# Patient Record
Sex: FEMALE | Race: BLACK OR AFRICAN AMERICAN | Employment: FULL TIME | URBAN - METROPOLITAN AREA
[De-identification: names, ages, dates, MRNs, and addresses within clinical notes are randomized per-mention and may not be internally consistent; named-entity substitution may affect disease eponyms.]

---

## 2019-06-24 ENCOUNTER — HOSPITAL ENCOUNTER (OUTPATIENT)
Dept: MAMMOGRAPHY | Age: 49
Discharge: HOME | End: 2019-06-29
Payer: COMMERCIAL

## 2019-06-24 DIAGNOSIS — Z12.31 VISIT FOR SCREENING MAMMOGRAM: ICD-10-CM

## 2019-06-24 PROCEDURE — 77067 SCR MAMMO BI INCL CAD: CPT

## 2020-06-24 ENCOUNTER — OFFICE VISIT (OUTPATIENT)
Dept: PRIMARY CARE CLINIC | Age: 50
End: 2020-06-24
Payer: COMMERCIAL

## 2020-06-24 PROCEDURE — 99211 OFF/OP EST MAY X REQ PHY/QHP: CPT | Performed by: INTERNAL MEDICINE

## 2020-06-24 NOTE — PROGRESS NOTES
6/24/2020    FLU/COVID-19 CLINIC EVALUATION    HPI  SYMPTOMS:    Symptom duration, days:  [] 1   [] 2   [] 3   [] 4   [] 5   [] 6   [] 7   [] 8   [] 9   [] 10   [] 11   [] 12   [] 13 [] 14 +      Symptom course:   [] Worsening     [] Stable     [] Improving    [] Fevers  [] Symptom (not measured)  [] Measured (Result: )  [] Chills  [] Cough  [] Coughing up blood  [] Productive  [] Dry  [] Chest Congestion  [] Chest Tightness  [] Nasal Congestion  [] Runny Nose  [] Feeling short of breath  [] Fatigue  [] Chest pain  [] Headaches  []Tolerable  [] Severe  [] Sore throat  [] Muscle aches  [] Nausea  [] Decreased appetite  [] Vomiting  []Unable to keep fluids down  [] Diarrhea  []Severe    [] OTHER SYMPTOMS:      RISK FACTORS:  [] Pregnant or possibly pregnant  [] Age over 61  [] Diabetes  [] Heart disease  [] Asthma  [] COPD/Other chronic lung diseases  [] Active Cancer  [] On Chemotherapy  [] Taking oral steroids  [] History Lymphoma/Leukemia  [] Close contact with a lab confirmed COVID-19 patient within 14 days of symptom onset  [] History of travel from affected geographical areas within 14 days of symptom onset  [] Health Care Worker Exposure no symptoms  [] Health Care Worker Exposure symptomatic      VITALS:  There were no vitals filed for this visit.      PHYSICAL EXAMINATION:  []Alert   []Oriented to person/place/time    []No apparent distress     []Toxic appearing    []Breathing appears normal     []Appears tachypneic         []Speaking in full sentences     TESTS:  POCT FLU:  [] Positive     []Negative  POCT STREP:  [] Positive     []Negative    [x] COVID-19 Test sent: [x] Blue   [] Red   [] Chest X-ray     ASSESSMENT:  [] Flu  [] Strep Throat  [] Uncertain Viral Respiratory Illness  [] Possible COVID-19  [x] Exposure COVID-19  [] Other:     PLAN:  [x] Discharge home with written instructions for:  [] Flu management  [] Strep throat management  [x] Possible COVID-19 exposure with self-quarantine   [] Possible COVID-19 with isolation instructions and management of symptoms  [] Follow-up with primary care physician or emergency department if worsens  [] Referred to emergency department for evaluation    [] Evaluation per physician/nurse practitioner in clinic  [] Prescription sent in  [] No Prescription sent in    An  electronic signature was used to authenticate this note.      --Almas Espinal MA on 6/24/2020 at 11:45 AM

## 2020-06-25 LAB
SARS-COV-2: NOT DETECTED
SOURCE: NORMAL

## 2021-06-22 ENCOUNTER — HOSPITAL ENCOUNTER (OUTPATIENT)
Dept: MAMMOGRAPHY | Age: 51
Discharge: HOME | End: 2021-06-27
Payer: COMMERCIAL

## 2021-06-22 VITALS — BODY MASS INDEX: 46.07 KG/M2 | HEIGHT: 63 IN | WEIGHT: 260 LBS

## 2021-06-22 DIAGNOSIS — Z12.31 VISIT FOR SCREENING MAMMOGRAM: ICD-10-CM

## 2021-06-22 PROCEDURE — 77063 BREAST TOMOSYNTHESIS BI: CPT

## 2021-10-07 ENCOUNTER — HOSPITAL ENCOUNTER (EMERGENCY)
Age: 51
Discharge: HOME | End: 2021-10-07
Attending: EMERGENCY MEDICINE
Payer: COMMERCIAL

## 2021-10-07 ENCOUNTER — APPOINTMENT (OUTPATIENT)
Dept: GENERAL RADIOLOGY | Age: 51
End: 2021-10-07
Payer: COMMERCIAL

## 2021-10-07 VITALS
HEART RATE: 72 BPM | RESPIRATION RATE: 18 BRPM | DIASTOLIC BLOOD PRESSURE: 82 MMHG | OXYGEN SATURATION: 99 % | SYSTOLIC BLOOD PRESSURE: 160 MMHG | TEMPERATURE: 102.6 F

## 2021-10-07 DIAGNOSIS — R05.9 COUGH: ICD-10-CM

## 2021-10-07 DIAGNOSIS — U07.1 COVID-19: Primary | ICD-10-CM

## 2021-10-07 LAB
ANION GAP SERPL CALCULATED.3IONS-SCNC: 11 MMOL/L (ref 3–16)
BASE EXCESS VENOUS: 4.1 MMOL/L (ref -2–3)
BASOPHILS ABSOLUTE: 0 K/UL (ref 0–0.2)
BASOPHILS RELATIVE PERCENT: 0.3 %
BILIRUBIN URINE: NEGATIVE
BLOOD, URINE: NEGATIVE
BUN BLDV-MCNC: 14 MG/DL (ref 7–20)
CALCIUM SERPL-MCNC: 8.7 MG/DL (ref 8.3–10.6)
CARBOXYHEMOGLOBIN: 0.9 % (ref 0–1.5)
CHLORIDE BLD-SCNC: 99 MMOL/L (ref 99–110)
CLARITY: CLEAR
CO2: 25 MMOL/L (ref 21–32)
COLOR: YELLOW
CREAT SERPL-MCNC: 1 MG/DL (ref 0.6–1.1)
EKG ATRIAL RATE: 67 BPM
EKG DIAGNOSIS: NORMAL
EKG P AXIS: 55 DEGREES
EKG P-R INTERVAL: 142 MS
EKG Q-T INTERVAL: 370 MS
EKG QRS DURATION: 64 MS
EKG QTC CALCULATION (BAZETT): 390 MS
EKG R AXIS: 65 DEGREES
EKG T AXIS: -5 DEGREES
EKG VENTRICULAR RATE: 67 BPM
EOSINOPHILS ABSOLUTE: 0 K/UL (ref 0–0.6)
EOSINOPHILS RELATIVE PERCENT: 0 %
GFR AFRICAN AMERICAN: >60
GFR NON-AFRICAN AMERICAN: 58
GLUCOSE BLD-MCNC: 140 MG/DL (ref 70–99)
GLUCOSE URINE: NEGATIVE MG/DL
HCO3 VENOUS: 30.4 MMOL/L (ref 24–28)
HCT VFR BLD CALC: 40.7 % (ref 36–48)
HEMOGLOBIN, VEN, REDUCED: 68.9 %
HEMOGLOBIN: 13.5 G/DL (ref 12–16)
KETONES, URINE: NEGATIVE MG/DL
LACTIC ACID, SEPSIS: 1 MMOL/L (ref 0.4–1.9)
LEUKOCYTE ESTERASE, URINE: NEGATIVE
LYMPHOCYTES ABSOLUTE: 0.7 K/UL (ref 1–5.1)
LYMPHOCYTES RELATIVE PERCENT: 16.9 %
MCH RBC QN AUTO: 26 PG (ref 26–34)
MCHC RBC AUTO-ENTMCNC: 33.1 G/DL (ref 31–36)
MCV RBC AUTO: 78.5 FL (ref 80–100)
METHEMOGLOBIN VENOUS: 0.6 % (ref 0–1.5)
MICROSCOPIC EXAMINATION: NORMAL
MONOCYTES ABSOLUTE: 0.2 K/UL (ref 0–1.3)
MONOCYTES RELATIVE PERCENT: 4.1 %
NEUTROPHILS ABSOLUTE: 3.4 K/UL (ref 1.7–7.7)
NEUTROPHILS RELATIVE PERCENT: 78.7 %
NITRITE, URINE: NEGATIVE
O2 SAT, VEN: 30 %
PCO2, VEN: 50.9 MMHG (ref 41–51)
PDW BLD-RTO: 15.8 % (ref 12.4–15.4)
PH UA: 6.5 (ref 5–8)
PH VENOUS: 7.38 (ref 7.35–7.45)
PLATELET # BLD: 202 K/UL (ref 135–450)
PMV BLD AUTO: 9 FL (ref 5–10.5)
PO2, VEN: <30 MMHG (ref 25–40)
POTASSIUM SERPL-SCNC: 4.5 MMOL/L (ref 3.5–5.1)
PROTEIN UA: NEGATIVE MG/DL
RBC # BLD: 5.18 M/UL (ref 4–5.2)
SODIUM BLD-SCNC: 135 MMOL/L (ref 136–145)
SPECIFIC GRAVITY UA: <=1.005 (ref 1–1.03)
TCO2 CALC VENOUS: 32 MMOL/L
TROPONIN: <0.01 NG/ML
URINE REFLEX TO CULTURE: NORMAL
URINE TYPE: NORMAL
UROBILINOGEN, URINE: 0.2 E.U./DL
WBC # BLD: 4.4 K/UL (ref 4–11)

## 2021-10-07 PROCEDURE — U0005 INFEC AGEN DETEC AMPLI PROBE: HCPCS

## 2021-10-07 PROCEDURE — 81003 URINALYSIS AUTO W/O SCOPE: CPT

## 2021-10-07 PROCEDURE — U0003 INFECTIOUS AGENT DETECTION BY NUCLEIC ACID (DNA OR RNA); SEVERE ACUTE RESPIRATORY SYNDROME CORONAVIRUS 2 (SARS-COV-2) (CORONAVIRUS DISEASE [COVID-19]), AMPLIFIED PROBE TECHNIQUE, MAKING USE OF HIGH THROUGHPUT TECHNOLOGIES AS DESCRIBED BY CMS-2020-01-R: HCPCS

## 2021-10-07 PROCEDURE — 82803 BLOOD GASES ANY COMBINATION: CPT

## 2021-10-07 PROCEDURE — 93005 ELECTROCARDIOGRAM TRACING: CPT | Performed by: EMERGENCY MEDICINE

## 2021-10-07 PROCEDURE — 36415 COLL VENOUS BLD VENIPUNCTURE: CPT

## 2021-10-07 PROCEDURE — 99283 EMERGENCY DEPT VISIT LOW MDM: CPT

## 2021-10-07 PROCEDURE — 84484 ASSAY OF TROPONIN QUANT: CPT

## 2021-10-07 PROCEDURE — 85025 COMPLETE CBC W/AUTO DIFF WBC: CPT

## 2021-10-07 PROCEDURE — 80048 BASIC METABOLIC PNL TOTAL CA: CPT

## 2021-10-07 PROCEDURE — 83605 ASSAY OF LACTIC ACID: CPT

## 2021-10-07 PROCEDURE — 71046 X-RAY EXAM CHEST 2 VIEWS: CPT

## 2021-10-07 PROCEDURE — 6370000000 HC RX 637 (ALT 250 FOR IP): Performed by: STUDENT IN AN ORGANIZED HEALTH CARE EDUCATION/TRAINING PROGRAM

## 2021-10-07 RX ORDER — ACETAMINOPHEN 325 MG/1
650 TABLET ORAL ONCE
Status: COMPLETED | OUTPATIENT
Start: 2021-10-07 | End: 2021-10-07

## 2021-10-07 RX ADMIN — ACETAMINOPHEN 650 MG: 325 TABLET ORAL at 16:48

## 2021-10-07 ASSESSMENT — ENCOUNTER SYMPTOMS
FACIAL SWELLING: 0
RHINORRHEA: 0
PHOTOPHOBIA: 0
EYE REDNESS: 0
NAUSEA: 0
EYE PAIN: 0
ABDOMINAL PAIN: 0
ABDOMINAL DISTENTION: 0
SHORTNESS OF BREATH: 1
COUGH: 1
VOMITING: 0
SORE THROAT: 0
DIARRHEA: 0
CHEST TIGHTNESS: 0

## 2021-10-07 ASSESSMENT — PAIN SCALES - GENERAL: PAINLEVEL_OUTOF10: 0

## 2021-10-07 NOTE — ED NOTES
Pt discharged from ED in stable, ambulatory condition. Discharge instructions explained, all questions answered. Pt walked to [facility] independently.        Oscar Bledsoe RN  10/07/21 2560

## 2021-10-07 NOTE — ED PROVIDER NOTES
ED Attending Attestation Note     Date of evaluation: 10/7/2021    This patient was seen by the resident. I have seen and examined the patient, agree with the workup, evaluation, management and diagnosis. The care plan has been discussed. I have reviewed the ECG and concur with the resident's interpretation. I was present for any procedures performed in the resident's  note and have made edits to the note where appropriate. My assessment reveals 46 y.o. female with history of obesity presenting to the emergency department today for Covid-like symptoms. She is vaccinated. She is febrile here but nontoxic-appearing, lungs clear to auscultation, no respiratory distress.          Mary Colin MD  10/07/21 2000

## 2021-10-07 NOTE — ED PROVIDER NOTES
[address] [facility] RESIDENT NOTE       Date of evaluation: 10/7/2021    Chief Complaint     Concern For COVID-19 (pt states, \"I was tested for covid at [address]  yesterday. I started with cough and shortness of breath at the beginning of the week. \")      History of Present Illness     Yvonne Adele is a 46 y.o. female with a PMH of HTN, DM2, and obesity, who presents with shortness of breath and cough. According to the patient, she had a known COVID exposure about 6-7 days ago. Over the past 4-5 days, she has been experiencing progressively worsening shortness of breath, as well as productive cough. No fever/chills. Reports associated loss of sense of taste/smell. No abdominal pain, nausea, vomiting. Denies any chest pain. Of note, patient is not vaccinated against COVID-19. Review of Systems     Review of Systems   Constitutional: Negative for activity change, appetite change, chills and fever. HENT: Negative for congestion, ear pain, facial swelling, rhinorrhea and sore throat. Eyes: Negative for photophobia, pain and redness. Respiratory: Positive for cough and shortness of breath. Negative for chest tightness. Cardiovascular: Negative for chest pain and leg swelling. Gastrointestinal: Negative for abdominal distention, abdominal pain, diarrhea, nausea and vomiting. Endocrine: Negative for polydipsia, polyphagia and polyuria. Genitourinary: Negative for difficulty urinating, dysuria and flank pain. Musculoskeletal: Negative for myalgias, neck pain and neck stiffness. Skin: Negative for pallor, rash and wound. Allergic/Immunologic: Negative for food allergies and immunocompromised state. Neurological: Negative for seizures, syncope and headaches. Past Medical, Surgical, Family, and Social History     She has no past medical history on file. She has no past surgical history on file.   Her family history includes Breast Cancer in her mother. She reports that she has never smoked. She has never used smokeless tobacco. She reports that she does not drink alcohol and does not use drugs. Medications     Previous Medications    No medications on file       Allergies     She has No Known Allergies. Physical Exam     INITIAL VITALS: BP: 129/76, Temp: 102.6 °F (39.2 °C), Pulse: 66, Resp: 20, SpO2: 95 %   Physical Exam  Constitutional:       General: She is not in acute distress. Appearance: She is not toxic-appearing. HENT:      Right Ear: Tympanic membrane normal.      Left Ear: Tympanic membrane normal.      Nose: Nose normal.      Mouth/Throat:      Pharynx: Oropharynx is clear. Eyes:      Extraocular Movements: Extraocular movements intact. Pupils: Pupils are equal, round, and reactive to light. Cardiovascular:      Rate and Rhythm: Normal rate and regular rhythm. Pulses: Normal pulses. Heart sounds: No murmur heard. Pulmonary:      Effort: Pulmonary effort is normal. No respiratory distress. Abdominal:      General: There is no distension. Palpations: Abdomen is soft. Tenderness: There is no abdominal tenderness. Musculoskeletal:         General: Normal range of motion. Cervical back: Normal range of motion. Right lower leg: No edema. Left lower leg: No edema. Skin:     General: Skin is warm. Capillary Refill: Capillary refill takes less than 2 seconds. Neurological:      General: No focal deficit present. Mental Status: She is alert and oriented to person, place, and time.          DiagnosticResults     EKG   Interpreted in conjunction with emergencydepartment physician Allyssa Johnson MD  Rhythm: normal sinus   Rate: normal  Axis: normal  Ectopy: none  Conduction: nonspecific interventricular conduction block  ST Segments: no acute change  T Waves:inversion in  v5, v6, III and aVl  Q Waves: nonspecific  Clinical Impression: Mild T wave inversions noted in the 5/6, as Type NotGiven     Urine Reflex to Culture Not Indicated    Lactate, Sepsis   Result Value Ref Range    Lactic Acid, Sepsis 1.0 0.4 - 1.9 mmol/L   Blood gas, venous (Lab)   Result Value Ref Range    pH, Shawn 7.384 7.350 - 7.450    pCO2, Shawn 50.9 41.0 - 51.0 mmHg    pO2, Shawn <30.0 25 - 40 mmHg    HCO3, Venous 30.4 (H) 24.0 - 28.0 mmol/L    Base Excess, Shawn 4.1 (H) -2.0 - 3.0 mmol/L    O2 Sat, Shawn 30 Not established %    Carboxyhemoglobin 0.9 0.0 - 1.5 %    MetHgb, Shawn 0.6 0.0 - 1.5 %    TC02 (Calc), Shawn 32 mmol/L    Hemoglobin, Shawn, Reduced 68.90 %   Troponin   Result Value Ref Range    Troponin <0.01 <0.01 ng/mL   EKG 12 Lead   Result Value Ref Range    Ventricular Rate 67 BPM    Atrial Rate 67 BPM    P-R Interval 142 ms    QRS Duration 64 ms    Q-T Interval 370 ms    QTc Calculation (Bazett) 390 ms    P Axis 55 degrees    R Axis 65 degrees    T Axis -5 degrees    Diagnosis       EKG performed in ER and to be interpreted by ER physician. Confirmed by MD, ER ([id number]),  Aparna Pabon ([phone]) on 10/7/2021 2:40:58 PM       ED BEDSIDE ULTRASOUND:  None    RECENT VITALS:  BP: (!) 160/82, Temp: 102.6 °F (39.2 °C), Pulse: 72,Resp: 18, SpO2: 99 %     Procedures     None     ED Course     Nursing Notes, Past Medical Hx, Past Surgical Hx, Social Hx, Allergies, and Family Hx were reviewed. The patient was given the followingmedications:  Orders Placed This Encounter   Medications    acetaminophen (TYLENOL) tablet 650 mg       CONSULTS:  None    MEDICAL DECISION MAKING / ASSESSMENT / PLAN   Upon presentation, patient was found to be alert, comfortable, with unremarkable vitals signs. Given the patient presents with cough and shortness of breath, shortly after being exposed to a person with known COVID-19, patient symptoms were concerning for underlying Covid pneumonia. Furthermore patient is not vaccinated, making her risk of infection even higher.   Underlying bacterial pneumonia was considered, specially in the setting of fever, yet deemed less likely given that patient denies any significant productive cough and had a nonfocal lung exam.  A pulmonary embolism was briefly considered as well, however deemed less likely due to lack of tachycardia, tachypnea, and/or hypoxia. Furthermore, she had no pleuritic component to her chest pain. CBC without leukocytosis. Metabolic panel without significant electrolyte abnormalities. Serum creatinine appears to be normal.  Lactic acid level was unremarkable. Troponin was undetectable. UA without signs of infection. COVID-19 swab was obtained. Mild opacities, mainly over the left lobe, noted on chest x-ray. Patient was given Tylenol for pain control. She was exerted for greater than 120 seconds without any desaturation below 94%. As result, patient was deemed appropriate for discharge home with outpatient PCP follow-up. Given her comorbidities including obesity, hypertension, and diabetes, she was deemed eligible for monoclonal antibodies if Covid test is positive. As a result she was given a referral for infusion beginning tomorrow if positive test results. Strict return precautions were thoroughly discussed with the patient. This patient was also evaluated by the attending physician. All care plans werediscussed and agreed upon. Clinical Impression     1. COVID-19    2. Cough        Disposition     PATIENT REFERRED TO:  Oliver Sharma  [address] [place] [id number] [phone]    Call   call for follow-up within a week      DISCHARGE MEDICATIONS:  New Prescriptions    No medications on file       DISPOSITION Decision To Discharge 10/07/2021 06:58:52 PM  DISCHARGE  At this time, the patient was deemed appropriate for discharge. Discharge instructions, including strict return precautions for new or worsening symptoms concerning to the patient, were provided.  All of her questions were answered satisfactorily, and she was subsequently sent home in stable condition. The patient is instructed to return to the emergency department should her symptoms worsen or any concern she believes warrants acute physician evaluation.     Gerald Ramos MD, [address]  PGY-2, Emergency Medicine     Gerald Ramos MD  10/07/21 6676

## 2021-10-08 ENCOUNTER — CLINICAL DOCUMENTATION (OUTPATIENT)
Dept: PULMONOLOGY | Age: 51
End: 2021-10-08

## 2021-10-08 LAB — SARS-COV-2: DETECTED

## 2021-10-08 NOTE — PROGRESS NOTES
I received a referral from ER about patient that presented with signs and symptoms consistent of COVID-19. Symptom onset was 4 days ago. Due to her BMI and diabetes she is a candidate for Regeneron. Patient is interested. SARS-CoV-2 PCR came back positive today.   I have filled out the Regeneron Covid therapy plan and faxed to the infusion center for them to call patient and set up infusion for next available

## 2021-10-09 ENCOUNTER — TELEPHONE (OUTPATIENT)
Dept: ONCOLOGY | Age: 51
End: 2021-10-09

## 2021-10-10 ENCOUNTER — HOSPITAL ENCOUNTER (INPATIENT)
Age: 51
LOS: 3 days | Discharge: HOME | DRG: 177 | End: 2021-10-13
Attending: EMERGENCY MEDICINE | Admitting: INTERNAL MEDICINE

## 2021-10-10 ENCOUNTER — APPOINTMENT (OUTPATIENT)
Dept: GENERAL RADIOLOGY | Age: 51
DRG: 177 | End: 2021-10-10

## 2021-10-10 ENCOUNTER — APPOINTMENT (OUTPATIENT)
Dept: CT IMAGING | Age: 51
DRG: 177 | End: 2021-10-10

## 2021-10-10 DIAGNOSIS — R09.02 HYPOXIA: Primary | ICD-10-CM

## 2021-10-10 DIAGNOSIS — E66.01 CLASS 3 SEVERE OBESITY WITH BODY MASS INDEX (BMI) OF 45.0 TO 49.9 IN ADULT, UNSPECIFIED OBESITY TYPE, UNSPECIFIED WHETHER SERIOUS COMORBIDITY PRESENT (HCC): ICD-10-CM

## 2021-10-10 DIAGNOSIS — U07.1 PNEUMONIA DUE TO COVID-19 VIRUS: ICD-10-CM

## 2021-10-10 DIAGNOSIS — J12.82 PNEUMONIA DUE TO COVID-19 VIRUS: ICD-10-CM

## 2021-10-10 DIAGNOSIS — U07.1 COVID-19 VIRUS INFECTION: ICD-10-CM

## 2021-10-10 DIAGNOSIS — E11.69 TYPE 2 DIABETES MELLITUS WITH OTHER SPECIFIED COMPLICATION, WITHOUT LONG-TERM CURRENT USE OF INSULIN (HCC): ICD-10-CM

## 2021-10-10 DIAGNOSIS — I10 ESSENTIAL HYPERTENSION: ICD-10-CM

## 2021-10-10 LAB
ANION GAP SERPL CALCULATED.3IONS-SCNC: 15 MMOL/L (ref 3–16)
BASOPHILS ABSOLUTE: 0 K/UL (ref 0–0.2)
BASOPHILS RELATIVE PERCENT: 0.7 %
BUN BLDV-MCNC: 13 MG/DL (ref 7–20)
C-REACTIVE PROTEIN: 264.9 MG/L (ref 0–5.1)
CALCIUM SERPL-MCNC: 8.7 MG/DL (ref 8.3–10.6)
CHLORIDE BLD-SCNC: 101 MMOL/L (ref 99–110)
CO2: 22 MMOL/L (ref 21–32)
CREAT SERPL-MCNC: 0.7 MG/DL (ref 0.6–1.1)
D DIMER: 369 NG/ML DDU (ref 0–229)
EOSINOPHILS ABSOLUTE: 0 K/UL (ref 0–0.6)
EOSINOPHILS RELATIVE PERCENT: 0.1 %
FERRITIN: 451.1 NG/ML (ref 15–150)
GFR AFRICAN AMERICAN: >60
GFR NON-AFRICAN AMERICAN: >60
GLUCOSE BLD-MCNC: 140 MG/DL (ref 70–99)
GLUCOSE BLD-MCNC: 141 MG/DL (ref 70–99)
GLUCOSE BLD-MCNC: 158 MG/DL (ref 70–99)
GLUCOSE BLD-MCNC: 186 MG/DL (ref 70–99)
HCT VFR BLD CALC: 38.9 % (ref 36–48)
HEMOGLOBIN: 12.7 G/DL (ref 12–16)
LYMPHOCYTES ABSOLUTE: 0.5 K/UL (ref 1–5.1)
LYMPHOCYTES RELATIVE PERCENT: 7.1 %
MCH RBC QN AUTO: 25.7 PG (ref 26–34)
MCHC RBC AUTO-ENTMCNC: 32.7 G/DL (ref 31–36)
MCV RBC AUTO: 78.5 FL (ref 80–100)
MONOCYTES ABSOLUTE: 0.1 K/UL (ref 0–1.3)
MONOCYTES RELATIVE PERCENT: 1.8 %
NEUTROPHILS ABSOLUTE: 6 K/UL (ref 1.7–7.7)
NEUTROPHILS RELATIVE PERCENT: 90.3 %
PDW BLD-RTO: 15.9 % (ref 12.4–15.4)
PERFORMED ON: ABNORMAL
PLATELET # BLD: 248 K/UL (ref 135–450)
PMV BLD AUTO: 10.2 FL (ref 5–10.5)
POTASSIUM SERPL-SCNC: 4.1 MMOL/L (ref 3.5–5.1)
PROCALCITONIN: 0.47 NG/ML (ref 0–0.15)
RBC # BLD: 4.96 M/UL (ref 4–5.2)
SODIUM BLD-SCNC: 138 MMOL/L (ref 136–145)
WBC # BLD: 6.7 K/UL (ref 4–11)

## 2021-10-10 PROCEDURE — 96374 THER/PROPH/DIAG INJ IV PUSH: CPT

## 2021-10-10 PROCEDURE — 6360000002 HC RX W HCPCS: Performed by: INTERNAL MEDICINE

## 2021-10-10 PROCEDURE — 6360000004 HC RX CONTRAST MEDICATION: Performed by: INTERNAL MEDICINE

## 2021-10-10 PROCEDURE — 94150 VITAL CAPACITY TEST: CPT

## 2021-10-10 PROCEDURE — 6370000000 HC RX 637 (ALT 250 FOR IP): Performed by: INTERNAL MEDICINE

## 2021-10-10 PROCEDURE — 2700000000 HC OXYGEN THERAPY PER DAY

## 2021-10-10 PROCEDURE — 85025 COMPLETE CBC W/AUTO DIFF WBC: CPT

## 2021-10-10 PROCEDURE — XW0DXM6 INTRODUCTION OF BARICITINIB INTO MOUTH AND PHARYNX, EXTERNAL APPROACH, NEW TECHNOLOGY GROUP 6: ICD-10-PCS | Performed by: INTERNAL MEDICINE

## 2021-10-10 PROCEDURE — 6360000002 HC RX W HCPCS: Performed by: EMERGENCY MEDICINE

## 2021-10-10 PROCEDURE — 2580000003 HC RX 258: Performed by: INTERNAL MEDICINE

## 2021-10-10 PROCEDURE — 85379 FIBRIN DEGRADATION QUANT: CPT

## 2021-10-10 PROCEDURE — 93005 ELECTROCARDIOGRAM TRACING: CPT | Performed by: EMERGENCY MEDICINE

## 2021-10-10 PROCEDURE — 71275 CT ANGIOGRAPHY CHEST: CPT

## 2021-10-10 PROCEDURE — 99285 EMERGENCY DEPT VISIT HI MDM: CPT

## 2021-10-10 PROCEDURE — 36415 COLL VENOUS BLD VENIPUNCTURE: CPT

## 2021-10-10 PROCEDURE — 80048 BASIC METABOLIC PNL TOTAL CA: CPT

## 2021-10-10 PROCEDURE — 2060000000 HC ICU INTERMEDIATE R&B

## 2021-10-10 PROCEDURE — 84145 PROCALCITONIN (PCT): CPT

## 2021-10-10 PROCEDURE — 71045 X-RAY EXAM CHEST 1 VIEW: CPT

## 2021-10-10 PROCEDURE — 94761 N-INVAS EAR/PLS OXIMETRY MLT: CPT

## 2021-10-10 PROCEDURE — 82728 ASSAY OF FERRITIN: CPT

## 2021-10-10 PROCEDURE — 86140 C-REACTIVE PROTEIN: CPT

## 2021-10-10 RX ORDER — POLYETHYLENE GLYCOL 3350 17 G/17G
17 POWDER, FOR SOLUTION ORAL DAILY PRN
Status: DISCONTINUED | OUTPATIENT
Start: 2021-10-10 | End: 2021-10-13 | Stop reason: HOSPADM

## 2021-10-10 RX ORDER — ONDANSETRON 4 MG/1
4 TABLET, ORALLY DISINTEGRATING ORAL EVERY 8 HOURS PRN
Status: DISCONTINUED | OUTPATIENT
Start: 2021-10-10 | End: 2021-10-13 | Stop reason: HOSPADM

## 2021-10-10 RX ORDER — ACETAMINOPHEN 650 MG/1
650 SUPPOSITORY RECTAL EVERY 6 HOURS PRN
Status: DISCONTINUED | OUTPATIENT
Start: 2021-10-10 | End: 2021-10-13 | Stop reason: HOSPADM

## 2021-10-10 RX ORDER — GUAIFENESIN/DEXTROMETHORPHAN 100-10MG/5
5 SYRUP ORAL EVERY 4 HOURS PRN
Status: DISCONTINUED | OUTPATIENT
Start: 2021-10-10 | End: 2021-10-13 | Stop reason: HOSPADM

## 2021-10-10 RX ORDER — VITAMIN B COMPLEX
2000 TABLET ORAL DAILY
Status: DISCONTINUED | OUTPATIENT
Start: 2021-10-10 | End: 2021-10-13 | Stop reason: HOSPADM

## 2021-10-10 RX ORDER — IBUPROFEN 200 MG
200 TABLET ORAL EVERY 8 HOURS PRN
COMMUNITY
End: 2021-10-18 | Stop reason: ALTCHOICE

## 2021-10-10 RX ORDER — SODIUM CHLORIDE 0.9 % (FLUSH) 0.9 %
5-40 SYRINGE (ML) INJECTION PRN
Status: DISCONTINUED | OUTPATIENT
Start: 2021-10-10 | End: 2021-10-13 | Stop reason: HOSPADM

## 2021-10-10 RX ORDER — ACETAMINOPHEN 325 MG/1
650 TABLET ORAL EVERY 6 HOURS PRN
Status: DISCONTINUED | OUTPATIENT
Start: 2021-10-10 | End: 2021-10-13 | Stop reason: HOSPADM

## 2021-10-10 RX ORDER — ONDANSETRON 2 MG/ML
4 INJECTION INTRAMUSCULAR; INTRAVENOUS EVERY 6 HOURS PRN
Status: DISCONTINUED | OUTPATIENT
Start: 2021-10-10 | End: 2021-10-13 | Stop reason: HOSPADM

## 2021-10-10 RX ORDER — SODIUM CHLORIDE 9 MG/ML
25 INJECTION, SOLUTION INTRAVENOUS PRN
Status: DISCONTINUED | OUTPATIENT
Start: 2021-10-10 | End: 2021-10-13 | Stop reason: HOSPADM

## 2021-10-10 RX ORDER — SODIUM CHLORIDE 0.9 % (FLUSH) 0.9 %
5-40 SYRINGE (ML) INJECTION EVERY 12 HOURS SCHEDULED
Status: DISCONTINUED | OUTPATIENT
Start: 2021-10-10 | End: 2021-10-13 | Stop reason: HOSPADM

## 2021-10-10 RX ORDER — PANTOPRAZOLE SODIUM 40 MG/1
40 TABLET, DELAYED RELEASE ORAL
Status: DISCONTINUED | OUTPATIENT
Start: 2021-10-10 | End: 2021-10-13 | Stop reason: HOSPADM

## 2021-10-10 RX ORDER — INSULIN LISPRO 100 [IU]/ML
0-12 INJECTION, SOLUTION INTRAVENOUS; SUBCUTANEOUS
Status: DISCONTINUED | OUTPATIENT
Start: 2021-10-10 | End: 2021-10-13 | Stop reason: HOSPADM

## 2021-10-10 RX ORDER — INSULIN LISPRO 100 [IU]/ML
0-6 INJECTION, SOLUTION INTRAVENOUS; SUBCUTANEOUS NIGHTLY
Status: DISCONTINUED | OUTPATIENT
Start: 2021-10-10 | End: 2021-10-13 | Stop reason: HOSPADM

## 2021-10-10 RX ORDER — DEXAMETHASONE SODIUM PHOSPHATE 4 MG/ML
6 INJECTION, SOLUTION INTRA-ARTICULAR; INTRALESIONAL; INTRAMUSCULAR; INTRAVENOUS; SOFT TISSUE ONCE
Status: COMPLETED | OUTPATIENT
Start: 2021-10-10 | End: 2021-10-10

## 2021-10-10 RX ORDER — DEXAMETHASONE 4 MG/1
6 TABLET ORAL DAILY
Status: DISCONTINUED | OUTPATIENT
Start: 2021-10-10 | End: 2021-10-13 | Stop reason: HOSPADM

## 2021-10-10 RX ADMIN — SODIUM CHLORIDE, PRESERVATIVE FREE 10 ML: 5 INJECTION INTRAVENOUS at 21:01

## 2021-10-10 RX ADMIN — GUAIFENESIN SYRUP AND DEXTROMETHORPHAN 5 ML: 100; 10 SYRUP ORAL at 18:15

## 2021-10-10 RX ADMIN — BARICITINIB 4 MG: 2 TABLET, FILM COATED ORAL at 17:01

## 2021-10-10 RX ADMIN — ENOXAPARIN SODIUM 40 MG: 40 INJECTION SUBCUTANEOUS at 17:06

## 2021-10-10 RX ADMIN — ENOXAPARIN SODIUM 40 MG: 40 INJECTION SUBCUTANEOUS at 21:01

## 2021-10-10 RX ADMIN — SODIUM CHLORIDE 25 ML: 9 INJECTION, SOLUTION INTRAVENOUS at 16:24

## 2021-10-10 RX ADMIN — DEXAMETHASONE SODIUM PHOSPHATE 6 MG: 4 INJECTION, SOLUTION INTRAMUSCULAR; INTRAVENOUS at 11:39

## 2021-10-10 RX ADMIN — IOPAMIDOL 80 ML: 755 INJECTION, SOLUTION INTRAVENOUS at 16:46

## 2021-10-10 RX ADMIN — PANTOPRAZOLE SODIUM 40 MG: 40 TABLET, DELAYED RELEASE ORAL at 17:03

## 2021-10-10 RX ADMIN — DEXAMETHASONE 6 MG: 4 TABLET ORAL at 17:03

## 2021-10-10 RX ADMIN — Medication 2000 UNITS: at 17:03

## 2021-10-10 RX ADMIN — CEFTRIAXONE 1000 MG: 1 INJECTION, POWDER, FOR SOLUTION INTRAMUSCULAR; INTRAVENOUS at 16:25

## 2021-10-10 RX ADMIN — AZITHROMYCIN MONOHYDRATE 500 MG: 500 INJECTION, POWDER, LYOPHILIZED, FOR SOLUTION INTRAVENOUS at 17:10

## 2021-10-10 ASSESSMENT — PAIN SCALES - GENERAL
PAINLEVEL_OUTOF10: 0

## 2021-10-10 ASSESSMENT — ENCOUNTER SYMPTOMS
EYE DISCHARGE: 0
COUGH: 1
SHORTNESS OF BREATH: 1
STRIDOR: 0
ABDOMINAL PAIN: 0

## 2021-10-10 NOTE — ED PROVIDER NOTES
[address] [facility]          ATTENDING PHYSICIAN NOTE       Date of evaluation: 10/10/2021    Chief Complaint     Shortness of Breath      History of Present Illness     Amanda Lopez is a 46 y.o. female who presents with SOB and COVID + diagnosed 10/7/21. HPI: 45 yo F with h/o HTN and NIDDM who presents with worsening fatigue, malaise, sore throat, and SOB x days. Endorses watery diarrhea, but non bloody; no emesis. States able to tolerate PO. Denies active chest pain. Not vaccinated for COVID-19    Pt contacted by Pulm (Dr. Iraj Correa) for Regeneron infusion in the future    Currently on 2-3L NC (new oxygen requirement)      PMHX: HTN, NIDDM  Allergies:No known allergies      Please see the work up tab for updates regarding this patient's evaluation and care in the Emergency Department      Review of Systems     Review of Systems   Constitutional: Positive for fatigue. Negative for chills and fever. HENT: Negative for congestion. Eyes: Negative for discharge. Respiratory: Positive for cough and shortness of breath. Negative for stridor. Cardiovascular: Negative for chest pain and leg swelling. Gastrointestinal: Negative for abdominal pain. Endocrine: Negative for polyuria. Genitourinary: Negative for dysuria and frequency. Musculoskeletal: Negative for joint swelling. Skin: Negative for rash. Neurological: Negative for light-headedness and headaches. Psychiatric/Behavioral: Negative for behavioral problems. Past Medical, Surgical, Family, and Social History     She has no past medical history on file. She has no past surgical history on file. Her family history includes Breast Cancer in her mother. She reports that she has never smoked. She has never used smokeless tobacco. She reports that she does not drink alcohol and does not use drugs.     Medications     Previous Medications    IBUPROFEN (ADVIL;MOTRIN) 600 MG TABLET    ibuprofen 600 mg tablet PHENYLEPHRINE (LAI-SYNEPHRINE) 0.25 % NASAL SPRAY    1 spray by Nasal route every 4 hours as needed for Congestion       Allergies     She has No Known Allergies. Physical Exam     INITIAL VITALS: BP: (!) 140/68, Temp: 98.6 °F (37 °C), Pulse: 70, Resp: 20, SpO2: 94 %   Physical Exam  Vitals reviewed. Constitutional:       Appearance: She is well-developed. HENT:      Head: Normocephalic and atraumatic. Mouth/Throat:      Mouth: Mucous membranes are moist.   Eyes:      General: No scleral icterus. Extraocular Movements: Extraocular movements intact. Conjunctiva/sclera: Conjunctivae normal.   Cardiovascular:      Rate and Rhythm: Normal rate and regular rhythm. Pulses: Normal pulses. Pulmonary:      Effort: Pulmonary effort is normal.      Breath sounds: No stridor. No wheezing. Comments: Ronchi in bibasilar fields  3L O2 requirement  Abdominal:      General: There is no distension. Palpations: Abdomen is soft. Tenderness: There is no abdominal tenderness. Comments: Obese   Musculoskeletal:      Cervical back: Normal range of motion and neck supple. No rigidity or tenderness. Right lower leg: No edema. Left lower leg: No edema. Lymphadenopathy:      Cervical: No cervical adenopathy. Skin:     General: Skin is warm and dry. Capillary Refill: Capillary refill takes less than 2 seconds. Neurological:      General: No focal deficit present. Mental Status: She is alert and oriented to person, place, and time. Diagnostic Results     EKG   Sinus rhythm; no obvious ischemic findings. Prior 10/7/21, relatively unchanged (old TWI in III)  Vent rate: 72 bpm  NE interval: 136 ms  QURS duration: 68ms  QTC-Baz 438 ms  Left axis  Artifact present. RADIOLOGY:  XR CHEST PORTABLE   Final Result      1. Moderate bilateral patchy airspace disease consistent with pneumonia. Atypical viral pneumonia should be considered.           LABS:   Results for orders placed or performed during the hospital encounter of 10/10/21   CBC auto differential   Result Value Ref Range    WBC 6.7 4.0 - 11.0 K/uL    RBC 4.96 4.00 - 5.20 M/uL    Hemoglobin 12.7 12.0 - 16.0 g/dL    Hematocrit 38.9 36.0 - 48.0 %    MCV 78.5 (L) 80.0 - 100.0 fL    MCH 25.7 (L) 26.0 - 34.0 pg    MCHC 32.7 31.0 - 36.0 g/dL    RDW 15.9 (H) 12.4 - 15.4 %    Platelets 193 400 - 518 K/uL    MPV 10.2 5.0 - 10.5 fL    Neutrophils % 90.3 %    Lymphocytes % 7.1 %    Monocytes % 1.8 %    Eosinophils % 0.1 %    Basophils % 0.7 %    Neutrophils Absolute 6.0 1.7 - 7.7 K/uL    Lymphocytes Absolute 0.5 (L) 1.0 - 5.1 K/uL    Monocytes Absolute 0.1 0.0 - 1.3 K/uL    Eosinophils Absolute 0.0 0.0 - 0.6 K/uL    Basophils Absolute 0.0 0.0 - 0.2 K/uL   Basic Metabolic Panel   Result Value Ref Range    Sodium 138 136 - 145 mmol/L    Potassium 4.1 3.5 - 5.1 mmol/L    Chloride 101 99 - 110 mmol/L    CO2 22 21 - 32 mmol/L    Anion Gap 15 3 - 16    Glucose 140 (H) 70 - 99 mg/dL    BUN 13 7 - 20 mg/dL    CREATININE 0.7 0.6 - 1.1 mg/dL    GFR Non-African American >60 >60    GFR African American >60 >60    Calcium 8.7 8.3 - 10.6 mg/dL   POCT Glucose   Result Value Ref Range    POC Glucose 141 (H) 70 - 99 mg/dl    Performed on ACCU-CHEK        ED BEDSIDE ULTRASOUND:  Not performed    RECENT VITALS:  BP: 132/79,Temp: 98.6 °F (37 °C), Pulse: 69, Resp: 18, SpO2: 96 %     Procedures         ED Course     Nursing Notes, Past Medical Hx, Past Surgical Hx, Social Hx,Allergies, and Family Hx were reviewed. patient was given the following medications:  Orders Placed This Encounter   Medications    dexamethasone (DECADRON) injection 6 mg       CONSULTS:    Staci Mills is a 46 y.o. female with HTN, obesity, and NIDDM who presents with new oxygen requirement and COVID 19 + status. Given oxygen requirement and COVID+ status, will repeat baseline labs/CXR/ EKG and plan for admission after dose of dexamethasone.   Given COVID 19 + and CXR consistent with related viral pna, will not start abx at this time. Pt is clinically well appearing and hydrated; No edema fo the lower etremities and cap refill less than 2 seconds. No tachycardia or respiratory disstress; denies active CP. Less c/f ACS, less c/f CHF 2/2 myocarditis, less c/f PE, less c/f PTX. Currently plan for admission given O2 requirement. Critical Care:    Clinical Impression     1. Hypoxia    2. COVID-19 virus infection    3. Essential hypertension    4. Class 3 severe obesity with body mass index (BMI) of 45.0 to 49.9 in adult, unspecified obesity type, unspecified whether serious comorbidity present ([facility] [address]    5. Type 2 diabetes mellitus with other specified complication, without long-term current use of insulin ([facility] [address]    6. Pneumonia due to COVID-19 virus      ED Course as of Oct 10 1224   Sun Oct 10, 2021   1200 ED MD CXR impression: increasing size of bilateral hazy infiltrates, c/w COVID-19 pna    [NM]   1204 Patient and any family at the bedside were updated regarding results from lab work/imaging/consultation discussion that was performed in the ED. All questions, comments, and concerns were answered as the bedside. [NM]   1205 Lymphocytes Absolute(!): 0.5 [NM]   7846 Patient signed out to hospitalist.    [NM]      ED Course User Index  [NM] Tiago Lux MD       Disposition     PATIENT REFERRED TO:  No follow-up provider specified.     DISCHARGE MEDICATIONS:  New Prescriptions    No medications on file       DISPOSITION Decision To Admit 10/10/2021 11:29:45 AM       Tiago Lux MD  10/10/21 7440

## 2021-10-10 NOTE — H&P
Hospital Medicine History & Physical      PCP: Fredis Rodríguez    Date of Admission: 10/10/2021    Date of Service: Pt seen/examined on 10/10/2021 and Admitted to Inpatient with expected LOS greater than two midnights due to medical therapy. Chief Complaint:  Shortness of breath      History Of Present Illness: The patient is a 46 y.o. female with medical h/o HTN and DM, not on any prescription medications, who presents to [facility] with worsening shortness of breath. Patient started to have symptoms about 8 days ago with not feeling well, body aches, fevers, chills and cough. She was seen in ER on 10/07/21 and diagnosed with COVID 19. She did not require O2 at that time and was discharged to home. She was referred to pulmonology for evaluation of antibody infusion. However during the process of getting that set up she started feeling more short of breath and returned to the ER today. A this time she reports her cough is worse with more phlegm. No chest pain. Ongoing fevers, body aches, fatigue, nausea and diarrhea. She was saturating in mid 80's in ER and placed on 2.5 liters NC. Updated friend Yovani Zhong per patient request at [phone]. Past Medical History:    DM and HTN    Past Surgical History:    Reviewed and non contributory. Medications Prior to Admission:    Prior to Admission medications    Medication Sig Start Date End Date Taking? Authorizing Provider   ibuprofen (ADVIL;MOTRIN) 600 MG tablet ibuprofen 600 mg tablet   Yes Historical Provider, MD   phenylephrine (LAI-SYNEPHRINE) 0.25 % nasal spray 1 spray by Nasal route every 4 hours as needed for Congestion   Yes Historical Provider, MD       Allergies:  Patient has no known allergies. Social History:  The patient currently lives at home. TOBACCO:   reports that she has never smoked. She has never used smokeless tobacco.  ETOH:   reports no history of alcohol use.       Family History:  Reviewed in detail and Positive as follows:        Problem Relation Age of Onset    Breast Cancer Mother        REVIEW OF SYSTEMS:   Positive and negative as noted in the HPI. All other systems reviewed and negative. PHYSICAL EXAM:    /79   Pulse 69   Temp 98.6 °F (37 °C) (Oral)   Resp 18   Ht 5' 3\" (1.6 m)   Wt 260 lb (117.9 kg)   SpO2 96%   BMI 46.06 kg/m²     General appearance: No apparent distress appears stated age and cooperative. HEENT Normal cephalic, atraumatic without obvious deformity. Conjunctivae/corneas clear. Neck: Supple, No jugular venous distention/bruits. Trachea midline without thyromegaly or adenopathy with full range of motion. Lungs: bilateral crackles, no wheezing  Heart: Regular rate and rhythm with Normal S1/S2 without murmurs, rubs or gallops, point of maximum impulse non-displaced  Abdomen: Soft, non-tender or non-distended without rigidity or guarding and positive bowel sounds all four quadrants. Extremities: No clubbing, cyanosis, or edema bilaterally. Skin: Skin color, texture, turgor normal.    Neurologic: Alert and oriented X 3, grossly non-focal.  Mental status: Alert, oriented, thought content appropriate. Capillary refill is brisk  Peripheral pulses 2+    CXR:  I have reviewed the CXR with the following interpretation: multifocal pneumonia  EKG:  I have reviewed the EKG with the following interpretation: sinus with non specific TW changes    CBC   Recent Labs     10/07/21  1452 10/10/21  1130   WBC 4.4 6.7   HGB 13.5 12.7   HCT 40.7 38.9    248      RENAL  Recent Labs     10/07/21  1452 10/10/21  1130   * 138   K 4.5 4.1   CL 99 101   CO2 25 22   BUN 14 13   CREATININE 1.0 0.7     LFT'S  No results for input(s): AST, ALT, ALB, BILIDIR, BILITOT, ALKPHOS in the last 72 hours. COAG  No results for input(s): INR in the last 72 hours.   CARDIAC ENZYMES  Recent Labs     10/07/21  1452   TROPONINI <0.01       U/A:    Lab Results   Component Value Date    COLORU Yellow

## 2021-10-10 NOTE — ED NOTES
Bed: B14-14  Expected date:   Expected time:   Means of arrival:   Comments:  [address], RN  10/10/21 1114

## 2021-10-11 ENCOUNTER — APPOINTMENT (OUTPATIENT)
Dept: VASCULAR LAB | Age: 51
DRG: 177 | End: 2021-10-11

## 2021-10-11 LAB
A/G RATIO: 0.8 (ref 1.1–2.2)
ALBUMIN SERPL-MCNC: 3.4 G/DL (ref 3.4–5)
ALP BLD-CCNC: 60 U/L (ref 40–129)
ALT SERPL-CCNC: 34 U/L (ref 10–40)
ANION GAP SERPL CALCULATED.3IONS-SCNC: 15 MMOL/L (ref 3–16)
AST SERPL-CCNC: 40 U/L (ref 15–37)
BASOPHILS ABSOLUTE: 0 K/UL (ref 0–0.2)
BASOPHILS RELATIVE PERCENT: 0.5 %
BILIRUB SERPL-MCNC: 0.3 MG/DL (ref 0–1)
BUN BLDV-MCNC: 17 MG/DL (ref 7–20)
C-REACTIVE PROTEIN: 176.2 MG/L (ref 0–5.1)
CALCIUM SERPL-MCNC: 8.7 MG/DL (ref 8.3–10.6)
CHLORIDE BLD-SCNC: 103 MMOL/L (ref 99–110)
CO2: 20 MMOL/L (ref 21–32)
CREAT SERPL-MCNC: 0.7 MG/DL (ref 0.6–1.1)
D DIMER: 254 NG/ML DDU (ref 0–229)
EKG ATRIAL RATE: 72 BPM
EKG DIAGNOSIS: NORMAL
EKG P AXIS: 45 DEGREES
EKG P-R INTERVAL: 136 MS
EKG Q-T INTERVAL: 400 MS
EKG QRS DURATION: 68 MS
EKG QTC CALCULATION (BAZETT): 438 MS
EKG R AXIS: 25 DEGREES
EKG T AXIS: 37 DEGREES
EKG VENTRICULAR RATE: 72 BPM
EOSINOPHILS ABSOLUTE: 0 K/UL (ref 0–0.6)
EOSINOPHILS RELATIVE PERCENT: 0.1 %
GFR AFRICAN AMERICAN: >60
GFR NON-AFRICAN AMERICAN: >60
GLOBULIN: 4.4 G/DL
GLUCOSE BLD-MCNC: 152 MG/DL (ref 70–99)
GLUCOSE BLD-MCNC: 155 MG/DL (ref 70–99)
GLUCOSE BLD-MCNC: 156 MG/DL (ref 70–99)
GLUCOSE BLD-MCNC: 206 MG/DL (ref 70–99)
GLUCOSE BLD-MCNC: 269 MG/DL (ref 70–99)
HCT VFR BLD CALC: 37.7 % (ref 36–48)
HEMOGLOBIN: 12.3 G/DL (ref 12–16)
LYMPHOCYTES ABSOLUTE: 0.6 K/UL (ref 1–5.1)
LYMPHOCYTES RELATIVE PERCENT: 16.9 %
MCH RBC QN AUTO: 25.2 PG (ref 26–34)
MCHC RBC AUTO-ENTMCNC: 32.5 G/DL (ref 31–36)
MCV RBC AUTO: 77.6 FL (ref 80–100)
MONOCYTES ABSOLUTE: 0.2 K/UL (ref 0–1.3)
MONOCYTES RELATIVE PERCENT: 6.7 %
NEUTROPHILS ABSOLUTE: 2.7 K/UL (ref 1.7–7.7)
NEUTROPHILS RELATIVE PERCENT: 75.8 %
PDW BLD-RTO: 15.6 % (ref 12.4–15.4)
PERFORMED ON: ABNORMAL
PLATELET # BLD: 246 K/UL (ref 135–450)
PMV BLD AUTO: 9.7 FL (ref 5–10.5)
POTASSIUM REFLEX MAGNESIUM: 4.4 MMOL/L (ref 3.5–5.1)
RBC # BLD: 4.85 M/UL (ref 4–5.2)
SODIUM BLD-SCNC: 138 MMOL/L (ref 136–145)
TOTAL PROTEIN: 7.8 G/DL (ref 6.4–8.2)
WBC # BLD: 3.6 K/UL (ref 4–11)

## 2021-10-11 PROCEDURE — 6370000000 HC RX 637 (ALT 250 FOR IP): Performed by: INTERNAL MEDICINE

## 2021-10-11 PROCEDURE — 94761 N-INVAS EAR/PLS OXIMETRY MLT: CPT

## 2021-10-11 PROCEDURE — 86140 C-REACTIVE PROTEIN: CPT

## 2021-10-11 PROCEDURE — 85379 FIBRIN DEGRADATION QUANT: CPT

## 2021-10-11 PROCEDURE — 87070 CULTURE OTHR SPECIMN AEROBIC: CPT

## 2021-10-11 PROCEDURE — 87205 SMEAR GRAM STAIN: CPT

## 2021-10-11 PROCEDURE — 36415 COLL VENOUS BLD VENIPUNCTURE: CPT

## 2021-10-11 PROCEDURE — 2580000003 HC RX 258: Performed by: INTERNAL MEDICINE

## 2021-10-11 PROCEDURE — 93970 EXTREMITY STUDY: CPT

## 2021-10-11 PROCEDURE — 85025 COMPLETE CBC W/AUTO DIFF WBC: CPT

## 2021-10-11 PROCEDURE — 6360000002 HC RX W HCPCS: Performed by: INTERNAL MEDICINE

## 2021-10-11 PROCEDURE — 2700000000 HC OXYGEN THERAPY PER DAY

## 2021-10-11 PROCEDURE — 94150 VITAL CAPACITY TEST: CPT

## 2021-10-11 PROCEDURE — 94669 MECHANICAL CHEST WALL OSCILL: CPT

## 2021-10-11 PROCEDURE — 2060000000 HC ICU INTERMEDIATE R&B

## 2021-10-11 PROCEDURE — 80053 COMPREHEN METABOLIC PANEL: CPT

## 2021-10-11 RX ORDER — DEXTROSE MONOHYDRATE 50 MG/ML
100 INJECTION, SOLUTION INTRAVENOUS PRN
Status: DISCONTINUED | OUTPATIENT
Start: 2021-10-11 | End: 2021-10-13 | Stop reason: HOSPADM

## 2021-10-11 RX ORDER — AMLODIPINE BESYLATE 5 MG/1
5 TABLET ORAL DAILY
Status: DISCONTINUED | OUTPATIENT
Start: 2021-10-11 | End: 2021-10-12

## 2021-10-11 RX ORDER — DEXTROSE MONOHYDRATE 25 G/50ML
12.5 INJECTION, SOLUTION INTRAVENOUS PRN
Status: DISCONTINUED | OUTPATIENT
Start: 2021-10-11 | End: 2021-10-13 | Stop reason: HOSPADM

## 2021-10-11 RX ORDER — NICOTINE POLACRILEX 4 MG
15 LOZENGE BUCCAL PRN
Status: DISCONTINUED | OUTPATIENT
Start: 2021-10-11 | End: 2021-10-13 | Stop reason: HOSPADM

## 2021-10-11 RX ORDER — GUAIFENESIN 600 MG/1
600 TABLET, EXTENDED RELEASE ORAL 2 TIMES DAILY
Status: DISCONTINUED | OUTPATIENT
Start: 2021-10-11 | End: 2021-10-13 | Stop reason: HOSPADM

## 2021-10-11 RX ORDER — ASPIRIN 81 MG/1
81 TABLET, CHEWABLE ORAL DAILY
COMMUNITY

## 2021-10-11 RX ADMIN — SODIUM CHLORIDE, PRESERVATIVE FREE 10 ML: 5 INJECTION INTRAVENOUS at 08:33

## 2021-10-11 RX ADMIN — AMLODIPINE BESYLATE 5 MG: 5 TABLET ORAL at 08:28

## 2021-10-11 RX ADMIN — INSULIN LISPRO 2 UNITS: 100 INJECTION, SOLUTION INTRAVENOUS; SUBCUTANEOUS at 09:11

## 2021-10-11 RX ADMIN — PANTOPRAZOLE SODIUM 40 MG: 40 TABLET, DELAYED RELEASE ORAL at 06:16

## 2021-10-11 RX ADMIN — ENOXAPARIN SODIUM 40 MG: 40 INJECTION SUBCUTANEOUS at 20:42

## 2021-10-11 RX ADMIN — DEXAMETHASONE 6 MG: 4 TABLET ORAL at 08:28

## 2021-10-11 RX ADMIN — CEFTRIAXONE 1000 MG: 1 INJECTION, POWDER, FOR SOLUTION INTRAMUSCULAR; INTRAVENOUS at 15:11

## 2021-10-11 RX ADMIN — AZITHROMYCIN MONOHYDRATE 500 MG: 500 INJECTION, POWDER, LYOPHILIZED, FOR SOLUTION INTRAVENOUS at 16:21

## 2021-10-11 RX ADMIN — SODIUM CHLORIDE 25 ML: 9 INJECTION, SOLUTION INTRAVENOUS at 15:09

## 2021-10-11 RX ADMIN — INSULIN LISPRO 6 UNITS: 100 INJECTION, SOLUTION INTRAVENOUS; SUBCUTANEOUS at 17:55

## 2021-10-11 RX ADMIN — INSULIN LISPRO 2 UNITS: 100 INJECTION, SOLUTION INTRAVENOUS; SUBCUTANEOUS at 12:41

## 2021-10-11 RX ADMIN — SODIUM CHLORIDE, PRESERVATIVE FREE 10 ML: 5 INJECTION INTRAVENOUS at 20:51

## 2021-10-11 RX ADMIN — Medication 2000 UNITS: at 08:28

## 2021-10-11 RX ADMIN — GUAIFENESIN 600 MG: 600 TABLET, EXTENDED RELEASE ORAL at 20:41

## 2021-10-11 RX ADMIN — GUAIFENESIN 600 MG: 600 TABLET, EXTENDED RELEASE ORAL at 12:41

## 2021-10-11 RX ADMIN — INSULIN LISPRO 2 UNITS: 100 INJECTION, SOLUTION INTRAVENOUS; SUBCUTANEOUS at 20:43

## 2021-10-11 RX ADMIN — BARICITINIB 4 MG: 2 TABLET, FILM COATED ORAL at 09:12

## 2021-10-11 RX ADMIN — SODIUM CHLORIDE 25 ML: 9 INJECTION, SOLUTION INTRAVENOUS at 16:20

## 2021-10-11 RX ADMIN — ENOXAPARIN SODIUM 40 MG: 40 INJECTION SUBCUTANEOUS at 08:28

## 2021-10-11 RX ADMIN — GUAIFENESIN SYRUP AND DEXTROMETHORPHAN 5 ML: 100; 10 SYRUP ORAL at 20:42

## 2021-10-11 RX ADMIN — GUAIFENESIN SYRUP AND DEXTROMETHORPHAN 5 ML: 100; 10 SYRUP ORAL at 01:10

## 2021-10-11 ASSESSMENT — PAIN SCALES - GENERAL
PAINLEVEL_OUTOF10: 0

## 2021-10-11 NOTE — CONSULTS
Clinical Pharmacy Progress Note  Medication History     Admit Date: 10/10/2021    Pharmacy asked to verify home medication list by Dr. Elizabeth Monique. List of of current medications patient is taking is complete. Home Medication list in EPIC updated to reflect changes noted below. Source of information: telephone interview with patient d/t isolation precautions    Patient's home pharmacy: [facility] ([phone])     Changes made to medication list:   Medications removed: (include reason, ex: therapy completed, inactive medication)   Phenylephrine nasal spray  Medications added:    Aspirin 81 mg daily  Medication doses adjusted:    Ibuprofen  Other notes:    Patient states she was previously on the following medications, but ran out of medication and did not not have refills (has been out of medications for > 6 months)  o Lisinopril-HCTZ 20/25 mg 1 tab daily  o Metformin ER - pt thinks 500 mg BID; last filled as 750 mg daily on 8/6/2019  o Vitamin D - pt unsure of dose      Please call with any questions.   Robles Pandya PharmD, BCPS  Wireless: [id number]   10/11/2021 1:09 PM

## 2021-10-11 NOTE — PROGRESS NOTES
Pts BP trended up to 177/84. Patient states, takes Lisinopril-hydrochlorothiazide 20mg-25mg PO daily, med was not on home list. Made MD aware ,and stated will make AM team aware and to have pharmacy to verify medication. Patient is asymptomatic at this time. All safety precautions in place per POC and call light within reach.

## 2021-10-11 NOTE — PLAN OF CARE
Problem: Gas Exchange - Impaired  Goal: Absence of hypoxia  10/11/2021 1644 by Leticia Leonard RN  Outcome: Ongoing  10/11/2021 0400 by Vamsi Johnson RN  Outcome: Ongoing   Pt on 2L NC and maintaining O2 90-93%  Problem: Breathing Pattern - Ineffective  Goal: Ability to achieve and maintain a regular respiratory rate  10/11/2021 1644 by Leticia Leonard RN  Outcome: Ongoing  10/11/2021 0400 by Vamsi Johnson RN  Outcome: Ongoing   Pt not complaining of SOB; Pt able to maintain O2 in the 90's on 2L NC  Problem:  Body Temperature -  Risk of, Imbalanced  Goal: Ability to maintain a body temperature within defined limits  10/11/2021 1644 by Leticia Leonard RN  Outcome: Ongoing  10/11/2021 0400 by Vamsi Johnson RN  Outcome: Ongoing   Pt afebrile this shift

## 2021-10-11 NOTE — CARE COORDINATION
Case Management Assessment           Initial Evaluation                Date / Time of Evaluation: 10/11/2021 4:24 PM                 Assessment Completed by: Stiven Herrmann RN    Patient Name: Ernst Duane     YOB: 1970  Diagnosis: Essential hypertension [I10]  Hypoxia [R09.02]  Type 2 diabetes mellitus with other specified complication, without long-term current use of insulin ([facility] [E11.69]  Class 3 severe obesity with body mass index (BMI) of 45.0 to 49.9 in adult, unspecified obesity type, unspecified whether serious comorbidity present ([facility] [E66.01, Z68.42]  COVID-19 virus infection [U07.1]  Pneumonia due to COVID-19 virus [U07.1, J12.82]     Date / Time: 10/10/2021 11:12 AM    Patient Admission Status: Inpatient    If patient is discharged prior to next notation, then this note serves as note for discharge by case management. Current PCP: Salazar Li Patient: No    Chart Reviewed: Yes  Patient/ Family Interviewed: Yes    Initial assessment completed at bedside with: patient over the phone due to isolation    Hospitalization in the last 30 days: No    Emergency Contacts:  Extended Emergency Contact Information  Primary Emergency Contact: [facility] Phone: [phone]  Relation: Niece/Nephew  Secondary Emergency Contact: Halima Joy, [address] Phone: [phone]  Relation: Other   needed?  No    Advance Directives:   Code Status: Full Code    Healthcare Power of : No      Financial  Payor: EBMS / Plan: EBMS / Product Type: *No Product type* /     Pre-cert required for SNF: Yes    Pharmacy    Manjinder Pan [facility], [address] [place] [address] [phone] Viviana Ramos [phone]  [address] [place] [id number]  Phone: [phone] Fax: [phone]      Potential assistance Purchasing Medications: Potential Assistance Purchasing Medications: No  Does Patient want to participate in local refill/ meds to beds program?: Not Assessed    Meds To Beds General Rules:  1. Can ONLY be done Monday- Friday between 8:30am-5pm  2. Prescription(s) must be in pharmacy by 3pm to be filled same day  3. Copy of patient's insurance/ prescription drug card and patient face sheet must be sent along with the prescription(s)  4. Cost of Rx cannot be added to hospital bill. If financial assistance is needed, please contact unit  or ;  or  CANNOT provide pharmacy voucher for patients co-pays  5. Patients can then  the prescription on their way out of the hospital at discharge, or pharmacy can deliver to the bedside if staff is available. (payment due at time of pick-up or delivery - cash, check, or card accepted)     Able to afford home medications/ co-pay costs: Yes    ADLS  Support Systems: Family Members    PT AM-PAC:   /24  OT AM-PAC:   /24    [place]: home alone   Steps:     Plans to RETURN to current housing: yes  Barriers to RETURNING to current housing: none    Home Care Information  Currently ACTIVE with [address]: No  Home Care Agency: Not Applicable          Durable Medical Equipment  DME Provider: n/a  Equipment: n/a    Home Oxygen and Respiratory Equipment  Has [facility] prior to admission: No  [address]: Not Applicable        DISCHARGE PLAN:  Disposition: Home- No Services Needed    Transportation PLAN for discharge: family     Factors facilitating achievement of predicted outcomes: Family support    Barriers to discharge: Medical complications    Additional Case Management Notes:   Patient is from home alone, independent pta. No CM needs at this time. Family to transport home at discharge.     The Plan for Transition of Care is related to the following treatment goals of Essential hypertension [I10]  Hypoxia [R09.02]  Type 2 diabetes mellitus with other specified complication, without long-term current use of insulin (HCC) [E11.69]  Class 3 severe obesity with body mass index (BMI) of 45.0 to 49.9 in adult, unspecified obesity type, unspecified whether serious comorbidity present ([facility] [E66.01, Z68.42]  COVID-19 virus infection [U07.1]  Pneumonia due to COVID-19 virus [U07.1, J12.82]    The Patient and/or patient representative Margaret Beebe and her family were provided with a choice of provider and agrees with the discharge plan Yes    Freedom of choice list was provided with basic dialogue that supports the patient's individualized plan of care/goals and shares the quality data associated with the providers.  Yes    Care Transition patient: No    Philippe Lyons RN  [facility] [employer]  Case Management Department  Ph: [phone]   Fax: [phone]

## 2021-10-11 NOTE — PLAN OF CARE
Problem: Gas Exchange - Impaired  Goal: Absence of hypoxia  Outcome: Ongoing   Patient currently on 2L O2 via NC, unlabored breathing at rest, c/o with SOB when exerted, frequent productive cough, PRN admins. SpO2 maintained greater than 92% while on 2L via NC. Problem: Isolation Precautions - Risk of Spread of Infection  Goal: Prevent transmission of infection  Outcome: Ongoing    Proper PPE worn, and effective handwashing performed and educated to patient, which stated understanding and prevention methods to reduce spread of infection. Problem: Nutrition Deficits  Goal: Optimize nutritional status  Outcome: Ongoing   Patient tolerating current diet, decrease appitite at this time due to COVID PNA, 25%-50% intake with each meal, supplements provided with each tray and documented % of intake, fluids and snacks offered in between meals.

## 2021-10-11 NOTE — PROGRESS NOTES
Hospitalist Progress Note      PCP: Edwige Pandya    Date of Admission: 10/10/2021    Chief Complaint on Admission: shortness of breath    Pt Seen/Examined and Chart Reviewed. Admitting dx COVID 19 PNA    SUBJECTIVE/OBJECTIVE:   Patient admitted from home with worsening symptoms and hypoxia due to COVID 19 PNA. She is on 2 liters O2 with low normal sats, reports ongoing dyspnea, cough with phlegm. Feels short of breath at rest and gets even worse with ambulation. Addendum: updated friend Hemalatha Griffith per patient request.     Allergies  Patient has no known allergies. Medications      Scheduled Meds:   amLODIPine  5 mg Oral Daily    sodium chloride flush  5-40 mL IntraVENous 2 times per day    enoxaparin  40 mg SubCUTAneous BID    dexamethasone  6 mg Oral Daily    Vitamin D  2,000 Units Oral Daily    insulin lispro  0-12 Units SubCUTAneous TID WC    insulin lispro  0-6 Units SubCUTAneous Nightly    pantoprazole  40 mg Oral QAM AC    cefTRIAXone (ROCEPHIN) IV  1,000 mg IntraVENous Q24H    azithromycin  500 mg IntraVENous Q24H    baricitinib  4 mg Oral Daily       Infusions:   sodium chloride 25 mL (10/10/21 1624)       PRN Meds:  phenylephrine, sodium chloride flush, sodium chloride, ondansetron **OR** ondansetron, polyethylene glycol, acetaminophen **OR** acetaminophen, guaiFENesin-dextromethorphan    Vitals    TEMPERATURE:  Current - Temp: 97.7 °F (36.5 °C);  Max - Temp  Av °F (37.2 °C)  Min: 97.7 °F (36.5 °C)  Max: 100.1 °F (37.8 °C)  RESPIRATIONS RANGE: Resp  Av  Min: 16  Max: 26  PULSE RANGE: Pulse  Av.6  Min: 58  Max: 75  BLOOD PRESSURE RANGE:  Systolic (28NWP), RMQ:330 , Min:132 , OQS:197   ; Diastolic (19VME), BFW:48, Min:76, Max:87    PULSE OXIMETRY RANGE: SpO2  Av.6 %  Min: 90 %  Max: 96 %  24HR INTAKE/OUTPUT:      Intake/Output Summary (Last 24 hours) at 10/11/2021 1153  Last data filed at 10/11/2021 0935  Gross per 24 hour   Intake 600 ml   Output --   Net 600 ml       Exam:      General appearance: mild resp distress, appears stated age and cooperative. Lungs: bilateral rales at bases  Heart: Regular rate and rhythm with Normal S1/S2 without  murmurs, rubs or gallops, point of maximum impulse non-displaced  Abdomen: Soft, non-tender or non-distended without rigidity or guarding and positive bowel sounds all four quadrants. Extremities: No clubbing, cyanosis, or edema bilaterally. Skin: Skin color, texture, turgor normal.    Neurologic: Alert and oriented X 3, grossly non-focal.  Mental status: Alert, oriented, thought content appropriate. Data    Recent Labs     10/10/21  1130 10/11/21  0500   WBC 6.7 3.6*   HGB 12.7 12.3   HCT 38.9 37.7    246      Recent Labs     10/10/21  1130 10/11/21  0500    138   K 4.1 4.4    103   CO2 22 20*   BUN 13 17   CREATININE 0.7 0.7     Recent Labs     10/11/21  0500   AST 40*   ALT 34   BILITOT 0.3   ALKPHOS 60     No results for input(s): INR in the last 72 hours. No results for input(s): CKTOTAL, CKMB, CKMBINDEX, TROPONINI in the last 72 hours. Consults:     IP CONSULT TO HOSPITALIST    Active Hospital Problems    Diagnosis Date Noted    Pneumonia due to COVID-19 virus [U07.1, J12.82] 10/10/2021         ASSESSMENT AND PLAN      Pneumonia due to COVID 19 with hypoxia:  continue close monitoring as sats are low normal on 2 liters  Case discussed with clinical pharmacist regarding treatment options available considering time line and presentation at the time of admission. cont decadron 6 mg daily  Cont baricitinib 4 mg daily  lovenox 40 BID  Ceftriaxone and azithromycin day 2  Follow up serial inflammatory markers and sputum cx    Elevated Ddimer:  CTA negative for PE on 10/10/21. Venous doppler pending     DM type 2:  Not on treatment at home  Place on ISS while on steroids     HTN:  BP elevated. Will ad norvasc. If pharmacy able to verify home meds- will switch to previous regimen.          DVT Prophylaxis: lovenox  Diet: ADULT DIET;  Regular; 4 carb choices (60 gm/meal)  Code Status: Full Code    PT/OT Eval Status:at baseline    Dispo - inpt    [place] MD

## 2021-10-11 NOTE — FLOWSHEET NOTE
Spiritual Care Note  Referral for ACP conversation; see ACP note. Spoke with pt by phone; provided [facility] information; also provided pastoral support for her hospital stay. Will continue to work with pt tomorrow to complete her ad's, after she reviews the forms. 10/11/2021  Chaplain Ryan Sutherland [facility]     10/11/21 1322   Encounter Summary   Services provided to: Patient  (spoke with pt by phone)   Referral/Consult From: [address] [place] [address]; Family members   Continue Visiting   ( 10 11 21 acp)   Complexity of Encounter Moderate   Length of Encounter 45 minutes   Advance Care Planning Yes   Routine   Type Initial   Assessment Calm; Approachable;Coping   Intervention Active listening;Explored feelings, thoughts, concerns;Sustaining presence/ Ministry of presence; Discussed illness/injury and it's impact   Outcome Concerns relieved;Engaged in conversation;Expressed feelings/needs/concerns;Coping

## 2021-10-11 NOTE — ACP (ADVANCE CARE PLANNING)
Advance Care Planning     Advance Care Planning Inpatient Note  Spiritual Care Department    Today's Date: 10/11/2021  Unit: [facility] 4 PCU    Received request from [employer]. Upon review of chart and communication with care team, patient's decision making abilities are not in question. . Patient and I spoke by phone. Goals of ACP Conversation:  Discuss advance care planning documents  Verify/update pt's decision makers    Health Care Decision Makers:     No healthcare decision makers have been documented. Click here to complete [address] including selection of the Healthcare Decision Maker Relationship (ie [facility] Diann")  Summary:  Updated Healthcare Decision Maker      Advance Care Planning Documents (Patient Wishes):  Healthcare Power of /Advance Directive Appointment of Health Care Agent     Assessment:  Spoke with pt by phone. Provided pastoral care as pt shared about her health, and she shared she is not feeling too bad. I asked about her decision makers and updated the contact names in her chart. Pt has a niece listed, Lon Johnson, and pt shared she is to be listed only as a next of kin. Pt added a new contact name, her friend Chalino Linda and wants her to be her primary decision maker. I asked if pt has advanced directives/ HCPOA paperwork and pt shared she does not have one, but wants to complete one. I provided education on [place] legal hierarchy and that her friend does not fall into that framework, and that completing a HCPOA will be necessary. I dropped off an [facility] form for pt to review, and set up a time tomorrow to work on this together by phone. Pt was agreeable to this, so that she could read the form through before it being completed. I will call pt in her room tomorrow and we will complete HCPOA at that time.        Interventions:  Provided education on documents for clarity and greater understanding  Discussed and provided education on state decision maker hierarchy  Encouraged ongoing ACP conversation with future decision makers and loved ones  Deferred ACP conversation until tomorrow 10 12 21, as patient will review advance directive at this time    Care Preferences Communicated:   No    Outcomes/Plan:  reviewed and updated pt's decision maker on chart; will complete HCPOA tomorrow    Electronically signed by Batsheva Holt, [address] on 10/11/2021 at 1:04 PM

## 2021-10-12 LAB
ANION GAP SERPL CALCULATED.3IONS-SCNC: 13 MMOL/L (ref 3–16)
BUN BLDV-MCNC: 26 MG/DL (ref 7–20)
C-REACTIVE PROTEIN: 75.6 MG/L (ref 0–5.1)
CALCIUM SERPL-MCNC: 8.6 MG/DL (ref 8.3–10.6)
CHLORIDE BLD-SCNC: 103 MMOL/L (ref 99–110)
CO2: 23 MMOL/L (ref 21–32)
CREAT SERPL-MCNC: 0.7 MG/DL (ref 0.6–1.1)
D DIMER: 242 NG/ML DDU (ref 0–229)
GFR AFRICAN AMERICAN: >60
GFR NON-AFRICAN AMERICAN: >60
GLUCOSE BLD-MCNC: 134 MG/DL (ref 70–99)
GLUCOSE BLD-MCNC: 155 MG/DL (ref 70–99)
GLUCOSE BLD-MCNC: 157 MG/DL (ref 70–99)
GLUCOSE BLD-MCNC: 210 MG/DL (ref 70–99)
GLUCOSE BLD-MCNC: 226 MG/DL (ref 70–99)
MAGNESIUM: 2.1 MG/DL (ref 1.8–2.4)
PERFORMED ON: ABNORMAL
POTASSIUM SERPL-SCNC: 4.5 MMOL/L (ref 3.5–5.1)
PROCALCITONIN: 0.15 NG/ML (ref 0–0.15)
SODIUM BLD-SCNC: 139 MMOL/L (ref 136–145)

## 2021-10-12 PROCEDURE — 84145 PROCALCITONIN (PCT): CPT

## 2021-10-12 PROCEDURE — 86140 C-REACTIVE PROTEIN: CPT

## 2021-10-12 PROCEDURE — 94640 AIRWAY INHALATION TREATMENT: CPT

## 2021-10-12 PROCEDURE — 85379 FIBRIN DEGRADATION QUANT: CPT

## 2021-10-12 PROCEDURE — 83735 ASSAY OF MAGNESIUM: CPT

## 2021-10-12 PROCEDURE — 80048 BASIC METABOLIC PNL TOTAL CA: CPT

## 2021-10-12 PROCEDURE — 6360000002 HC RX W HCPCS: Performed by: INTERNAL MEDICINE

## 2021-10-12 PROCEDURE — 94761 N-INVAS EAR/PLS OXIMETRY MLT: CPT

## 2021-10-12 PROCEDURE — 2700000000 HC OXYGEN THERAPY PER DAY

## 2021-10-12 PROCEDURE — 2580000003 HC RX 258: Performed by: INTERNAL MEDICINE

## 2021-10-12 PROCEDURE — 6370000000 HC RX 637 (ALT 250 FOR IP): Performed by: INTERNAL MEDICINE

## 2021-10-12 PROCEDURE — 2060000000 HC ICU INTERMEDIATE R&B

## 2021-10-12 PROCEDURE — 36415 COLL VENOUS BLD VENIPUNCTURE: CPT

## 2021-10-12 PROCEDURE — 94669 MECHANICAL CHEST WALL OSCILL: CPT

## 2021-10-12 PROCEDURE — 87449 NOS EACH ORGANISM AG IA: CPT

## 2021-10-12 RX ORDER — LISINOPRIL 20 MG/1
20 TABLET ORAL DAILY
Status: DISCONTINUED | OUTPATIENT
Start: 2021-10-13 | End: 2021-10-13 | Stop reason: HOSPADM

## 2021-10-12 RX ORDER — AZITHROMYCIN 250 MG/1
500 TABLET, FILM COATED ORAL DAILY
Status: DISCONTINUED | OUTPATIENT
Start: 2021-10-12 | End: 2021-10-13 | Stop reason: HOSPADM

## 2021-10-12 RX ORDER — HYDROCHLOROTHIAZIDE 25 MG/1
12.5 TABLET ORAL DAILY
Status: DISCONTINUED | OUTPATIENT
Start: 2021-10-13 | End: 2021-10-13 | Stop reason: HOSPADM

## 2021-10-12 RX ADMIN — GUAIFENESIN 600 MG: 600 TABLET, EXTENDED RELEASE ORAL at 22:10

## 2021-10-12 RX ADMIN — BARICITINIB 4 MG: 2 TABLET, FILM COATED ORAL at 11:48

## 2021-10-12 RX ADMIN — Medication 2000 UNITS: at 07:47

## 2021-10-12 RX ADMIN — SODIUM CHLORIDE, PRESERVATIVE FREE 10 ML: 5 INJECTION INTRAVENOUS at 22:16

## 2021-10-12 RX ADMIN — CEFTRIAXONE 1000 MG: 1 INJECTION, POWDER, FOR SOLUTION INTRAMUSCULAR; INTRAVENOUS at 17:40

## 2021-10-12 RX ADMIN — DEXAMETHASONE 6 MG: 4 TABLET ORAL at 07:47

## 2021-10-12 RX ADMIN — AZITHROMYCIN 500 MG: 250 TABLET, FILM COATED ORAL at 17:31

## 2021-10-12 RX ADMIN — GUAIFENESIN 600 MG: 600 TABLET, EXTENDED RELEASE ORAL at 07:47

## 2021-10-12 RX ADMIN — INSULIN LISPRO 4 UNITS: 100 INJECTION, SOLUTION INTRAVENOUS; SUBCUTANEOUS at 11:46

## 2021-10-12 RX ADMIN — ENOXAPARIN SODIUM 40 MG: 40 INJECTION SUBCUTANEOUS at 07:48

## 2021-10-12 RX ADMIN — AMLODIPINE BESYLATE 5 MG: 5 TABLET ORAL at 07:47

## 2021-10-12 RX ADMIN — SODIUM CHLORIDE 25 ML: 9 INJECTION, SOLUTION INTRAVENOUS at 17:39

## 2021-10-12 RX ADMIN — INSULIN LISPRO 4 UNITS: 100 INJECTION, SOLUTION INTRAVENOUS; SUBCUTANEOUS at 17:33

## 2021-10-12 RX ADMIN — ENOXAPARIN SODIUM 40 MG: 40 INJECTION SUBCUTANEOUS at 22:10

## 2021-10-12 RX ADMIN — PANTOPRAZOLE SODIUM 40 MG: 40 TABLET, DELAYED RELEASE ORAL at 06:42

## 2021-10-12 RX ADMIN — SODIUM CHLORIDE, PRESERVATIVE FREE 10 ML: 5 INJECTION INTRAVENOUS at 07:47

## 2021-10-12 RX ADMIN — INSULIN LISPRO 1 UNITS: 100 INJECTION, SOLUTION INTRAVENOUS; SUBCUTANEOUS at 22:11

## 2021-10-12 ASSESSMENT — PAIN SCALES - GENERAL
PAINLEVEL_OUTOF10: 0

## 2021-10-12 NOTE — ACP (ADVANCE CARE PLANNING)
Advance Care Planning     Advance Care Planning Inpatient Note  Spiritual Care Department    Today's Date: 10/12/2021  Unit: [facility] 4 PCU    Received request from [employer]. Upon review of chart and communication with care team, patient's decision making abilities are not in question. . Patient was/were present in the room during visit. Goals of ACP Conversation:  Discuss advance care planning documents    Health Care Decision Makers:       Primary Decision Maker: Susan Morrow - Other - [phone]    Secondary Decision Maker: Tyler Garciaster - Brother/Sister - [phone]    Supplemental (Other) Decision Maker: Mary Sena Brother/Sister - [phone]    Summary:  Completed [address]      Advance Care Planning Documents (Patient Wishes):  Healthcare Power of /Advance Directive Appointment of [address]     Assessment:  Spoke with pt by phone, and helped her complete [address] Pt named her friend Angeles Garcia, and her two sisters Severiano Dodson, 2nd and Kavita 3rd. Answered questions, signed completed forms, faxed to medical records and original and copy given to pt. Interventions:  Provided education on documents for clarity and greater understanding  Discussed and provided education on state decision maker hierarchy  Assisted in the completion of documents according to patient's wishes at this time  Encouraged ongoing ACP conversation with future decision makers and loved ones    Care Preferences Communicated:   No    Outcomes/Plan:  New advance directive completed. Returned original document(s) to patient, as well as copies for distribution to appointed agents  Copy of advance directive given to staff to scan into medical record.   updated chart with current decision makers    Electronically signed by Blanquita Mac[facility] on 10/12/2021 at 2:21 PM

## 2021-10-12 NOTE — CARE COORDINATION
Patient is from home alone, independent pta. She may need HHC and home O2 at discharge. Family to transport home.     Perla Browning, RN, BSN,   4th Floor Progressive Care Unit  [phone]

## 2021-10-12 NOTE — PROGRESS NOTES
Hospitalist Progress Note      PCP: No primary care provider on file. Date of Admission: 10/10/2021    Chief Complaint on Admission: shortness of breath    Pt Seen/Examined and Chart Reviewed. Admitting dx COVID 19 PNA    SUBJECTIVE/OBJECTIVE:   Patient admitted from home with worsening symptoms and hypoxia due to COVID 19 PNA. Patient reports ongoing chest congestion, frequent cough, a lot of phlegm. Still with dyspnea even at rest. Unable to move around much but was motivated to try today. Allergies  Patient has no known allergies. Medications      Scheduled Meds:   azithromycin  500 mg Oral Daily    [START ON 10/13/2021] lisinopril  20 mg Oral Daily    [START ON 10/13/2021] hydroCHLOROthiazide  12.5 mg Oral Daily    guaiFENesin  600 mg Oral BID    sodium chloride flush  5-40 mL IntraVENous 2 times per day    enoxaparin  40 mg SubCUTAneous BID    dexamethasone  6 mg Oral Daily    Vitamin D  2,000 Units Oral Daily    insulin lispro  0-12 Units SubCUTAneous TID WC    insulin lispro  0-6 Units SubCUTAneous Nightly    pantoprazole  40 mg Oral QAM AC    cefTRIAXone (ROCEPHIN) IV  1,000 mg IntraVENous Q24H    baricitinib  4 mg Oral Daily       Infusions:   dextrose      sodium chloride 25 mL (10/11/21 1620)       PRN Meds:  glucose, dextrose, glucagon (rDNA), dextrose, sodium chloride flush, sodium chloride, ondansetron **OR** ondansetron, polyethylene glycol, acetaminophen **OR** acetaminophen, guaiFENesin-dextromethorphan    Vitals    TEMPERATURE:  Current - Temp: 98.4 °F (36.9 °C);  Max - Temp  Av.8 °F (36.6 °C)  Min: 96.9 °F (36.1 °C)  Max: 98.4 °F (36.9 °C)  RESPIRATIONS RANGE: Resp  Av.3  Min: 16  Max: 18  PULSE RANGE: Pulse  Av.6  Min: 51  Max: 71  BLOOD PRESSURE RANGE:  Systolic (49OWE), OHJ:615 , Min:127 , NJT:327   ; Diastolic (07TXM), NRH:95, Min:78, Max:89    PULSE OXIMETRY RANGE: SpO2  Av.1 %  Min: 90 %  Max: 97 %  24HR INTAKE/OUTPUT:      Intake/Output Summary (Last 24 hours) at 10/12/2021 1528  Last data filed at 10/12/2021 1102  Gross per 24 hour   Intake 899.98 ml   Output --   Net 899.98 ml       Exam:      General appearance: mild resp distress, appears stated age and cooperative. Lungs: bilateral rales at bases  Heart: Regular rate and rhythm with Normal S1/S2 without  murmurs, rubs or gallops, point of maximum impulse non-displaced  Abdomen: Soft, non-tender or non-distended without rigidity or guarding and positive bowel sounds all four quadrants. Extremities: No clubbing, cyanosis, or edema bilaterally. Skin: Skin color, texture, turgor normal.    Neurologic: Alert and oriented X 3, grossly non-focal.  Mental status: Alert, oriented, thought content appropriate. Data    Recent Labs     10/10/21  1130 10/11/21  0500   WBC 6.7 3.6*   HGB 12.7 12.3   HCT 38.9 37.7    246      Recent Labs     10/10/21  1130 10/11/21  0500 10/12/21  0435    138 139   K 4.1 4.4 4.5    103 103   CO2 22 20* 23   BUN 13 17 26*   CREATININE 0.7 0.7 0.7     Recent Labs     10/11/21  0500   AST 40*   ALT 34   BILITOT 0.3   ALKPHOS 60     No results for input(s): INR in the last 72 hours. No results for input(s): CKTOTAL, CKMB, CKMBINDEX, TROPONINI in the last 72 hours. Consults:     IP CONSULT TO HOSPITALIST  IP CONSULT TO PHARMACY  IP CONSULT TO [address] [place] [address]    Diagnosis Date Noted    Pneumonia due to COVID-19 virus [U07.1, J12.82] 10/10/2021         ASSESSMENT AND PLAN      Pneumonia due to COVID 19 with hypoxia:  continue close monitoring as sats are low normal on 2 liters  Case discussed with clinical pharmacist regarding treatment options available considering time line and presentation at the time of admission.    cont decadron 6 mg daily  Cont baricitinib 4 mg daily  lovenox 40 BID  Ceftriaxone and azithromycin day 3  Follow up serial inflammatory markers and sputum cx- NGTD    Elevated Ddimer:  CTA negative for PE on 10/10/21. Venous doppler negative     DM type 2:  Not on treatment at home  cont on ISS while on steroids     HTN:  BP elevated. Was on lisinopril/HCTZ prior to changing insurance. Did well on it in the past. Will change from [facility]. Will refer to [facility] outpatient clinic. DVT Prophylaxis: lovenox  Diet: ADULT DIET;  Regular; 4 carb choices (60 gm/meal)  Code Status: Full Code    PT/OT Eval Status:at baseline    Dispo - inpt    Yobany Davis MD

## 2021-10-12 NOTE — PLAN OF CARE
Problem: Gas Exchange - Impaired  Goal: Absence of hypoxia  10/12/2021 0555 by Ning Lopez RN  Outcome: Ongoing   Patient remains free from any s/s of resp. distress, unlabored breathing at rest, c/o with SOB when exerted,  non-productive cough. SpO2 maintained greater than 92% while on 2L via NC. Problem: Isolation Precautions - Risk of Spread of Infection  Goal: Prevent transmission of infection  10/12/2021 0555 by Ning Lopez RN  Outcome: Ongoing    Proper PPE worn, and effective handwashing performed and educated to patient, which stated understanding and prevention methods to reduce spread of infection. Problem: Risk for Fluid Volume Deficit  Goal: Maintain normal heart rhythm  10/12/2021 0555 by Ning Lopez RN  Outcome: Ongoing   HR SB to NSR through out this shift, no abnormalities noted.

## 2021-10-12 NOTE — PLAN OF CARE
Problem: Airway Clearance - Ineffective  Goal: Achieve or maintain patent airway  10/12/2021 1600 by Wes Nagy RN  Outcome: Met This Shift     Problem: Gas Exchange - Impaired  Goal: Absence of hypoxia  10/12/2021 0555 by Deborah Fink RN  Outcome: Ongoing     Problem: Breathing Pattern - Ineffective  Goal: Ability to achieve and maintain a regular respiratory rate  10/12/2021 1600 by Wes Nagy RN  Outcome: Met This Shift     Problem: Body Temperature -  Risk of, Imbalanced  Goal: Ability to maintain a body temperature within defined limits  10/12/2021 1600 by Wes Nagy RN  Outcome: Met This Shift     Problem:  Body Temperature -  Risk of, Imbalanced  Goal: Will regain or maintain usual level of consciousness  10/12/2021 0555 by Deborah Fink RN  Outcome: Ongoing     Problem: Isolation Precautions - Risk of Spread of Infection  Goal: Prevent transmission of infection  10/12/2021 1600 by Wes Nagy RN  Outcome: Met This Shift     Problem: Nutrition Deficits  Goal: Optimize nutritional status  10/12/2021 1600 by Wes Nagy RN  Outcome: Met This Shift

## 2021-10-13 VITALS
DIASTOLIC BLOOD PRESSURE: 65 MMHG | OXYGEN SATURATION: 94 % | SYSTOLIC BLOOD PRESSURE: 138 MMHG | RESPIRATION RATE: 18 BRPM | BODY MASS INDEX: 45.62 KG/M2 | WEIGHT: 257.5 LBS | HEART RATE: 56 BPM | TEMPERATURE: 97.5 F | HEIGHT: 63 IN

## 2021-10-13 PROBLEM — R09.02 HYPOXIA: Status: ACTIVE | Noted: 2021-10-13

## 2021-10-13 PROBLEM — I10 ESSENTIAL HYPERTENSION: Status: ACTIVE | Noted: 2021-10-13

## 2021-10-13 PROBLEM — E66.01 CLASS 3 SEVERE OBESITY WITH BODY MASS INDEX (BMI) OF 45.0 TO 49.9 IN ADULT (HCC): Status: ACTIVE | Noted: 2021-10-13

## 2021-10-13 PROBLEM — E11.9 DIABETES MELLITUS (HCC): Status: ACTIVE | Noted: 2021-10-13

## 2021-10-13 LAB
ANION GAP SERPL CALCULATED.3IONS-SCNC: 13 MMOL/L (ref 3–16)
BUN BLDV-MCNC: 26 MG/DL (ref 7–20)
C-REACTIVE PROTEIN: 37.3 MG/L (ref 0–5.1)
CALCIUM SERPL-MCNC: 8.8 MG/DL (ref 8.3–10.6)
CHLORIDE BLD-SCNC: 104 MMOL/L (ref 99–110)
CO2: 23 MMOL/L (ref 21–32)
CREAT SERPL-MCNC: 0.6 MG/DL (ref 0.6–1.1)
CULTURE, RESPIRATORY: NORMAL
EKG ATRIAL RATE: 52 BPM
EKG DIAGNOSIS: NORMAL
EKG P AXIS: 48 DEGREES
EKG P-R INTERVAL: 144 MS
EKG Q-T INTERVAL: 418 MS
EKG QRS DURATION: 74 MS
EKG QTC CALCULATION (BAZETT): 388 MS
EKG R AXIS: 13 DEGREES
EKG T AXIS: -8 DEGREES
EKG VENTRICULAR RATE: 52 BPM
GFR AFRICAN AMERICAN: >60
GFR NON-AFRICAN AMERICAN: >60
GLUCOSE BLD-MCNC: 114 MG/DL (ref 70–99)
GLUCOSE BLD-MCNC: 136 MG/DL (ref 70–99)
GLUCOSE BLD-MCNC: 152 MG/DL (ref 70–99)
GRAM STAIN RESULT: NORMAL
L. PNEUMOPHILA SEROGP 1 UR AG: NORMAL
MAGNESIUM: 2.2 MG/DL (ref 1.8–2.4)
PERFORMED ON: ABNORMAL
PERFORMED ON: ABNORMAL
POTASSIUM SERPL-SCNC: 4.6 MMOL/L (ref 3.5–5.1)
SODIUM BLD-SCNC: 140 MMOL/L (ref 136–145)
STREP PNEUMONIAE ANTIGEN, URINE: NORMAL

## 2021-10-13 PROCEDURE — 80048 BASIC METABOLIC PNL TOTAL CA: CPT

## 2021-10-13 PROCEDURE — 2700000000 HC OXYGEN THERAPY PER DAY

## 2021-10-13 PROCEDURE — 86140 C-REACTIVE PROTEIN: CPT

## 2021-10-13 PROCEDURE — 83735 ASSAY OF MAGNESIUM: CPT

## 2021-10-13 PROCEDURE — 36415 COLL VENOUS BLD VENIPUNCTURE: CPT

## 2021-10-13 PROCEDURE — 94680 O2 UPTK RST&XERS DIR SIMPLE: CPT

## 2021-10-13 PROCEDURE — 93010 ELECTROCARDIOGRAM REPORT: CPT | Performed by: INTERNAL MEDICINE

## 2021-10-13 PROCEDURE — 2580000003 HC RX 258: Performed by: INTERNAL MEDICINE

## 2021-10-13 PROCEDURE — 6370000000 HC RX 637 (ALT 250 FOR IP): Performed by: INTERNAL MEDICINE

## 2021-10-13 PROCEDURE — 94669 MECHANICAL CHEST WALL OSCILL: CPT

## 2021-10-13 PROCEDURE — 93005 ELECTROCARDIOGRAM TRACING: CPT | Performed by: INTERNAL MEDICINE

## 2021-10-13 PROCEDURE — 6360000002 HC RX W HCPCS: Performed by: INTERNAL MEDICINE

## 2021-10-13 PROCEDURE — 94761 N-INVAS EAR/PLS OXIMETRY MLT: CPT

## 2021-10-13 RX ORDER — AZITHROMYCIN 500 MG/1
500 TABLET, FILM COATED ORAL DAILY
Qty: 2 TABLET | Refills: 0 | Status: SHIPPED | OUTPATIENT
Start: 2021-10-13 | End: 2021-10-15

## 2021-10-13 RX ORDER — LISINOPRIL AND HYDROCHLOROTHIAZIDE 20; 12.5 MG/1; MG/1
1 TABLET ORAL DAILY
Qty: 30 TABLET | Refills: 1 | Status: ON HOLD | OUTPATIENT
Start: 2021-10-13 | End: 2021-10-29 | Stop reason: HOSPADM

## 2021-10-13 RX ORDER — METFORMIN HYDROCHLORIDE 500 MG/1
500 TABLET, EXTENDED RELEASE ORAL
Qty: 30 TABLET | Refills: 0 | Status: ON HOLD | OUTPATIENT
Start: 2021-10-13 | End: 2021-10-29 | Stop reason: SDUPTHER

## 2021-10-13 RX ORDER — GUAIFENESIN 600 MG/1
600 TABLET, EXTENDED RELEASE ORAL 2 TIMES DAILY PRN
Qty: 60 TABLET | Refills: 0 | Status: SHIPPED | OUTPATIENT
Start: 2021-10-13 | End: 2021-10-18 | Stop reason: ALTCHOICE

## 2021-10-13 RX ORDER — DEXAMETHASONE 6 MG/1
6 TABLET ORAL DAILY
Qty: 6 TABLET | Refills: 0 | Status: SHIPPED | OUTPATIENT
Start: 2021-10-14 | End: 2021-10-20

## 2021-10-13 RX ORDER — CEFDINIR 300 MG/1
300 CAPSULE ORAL 2 TIMES DAILY
Qty: 10 CAPSULE | Refills: 0 | Status: SHIPPED | OUTPATIENT
Start: 2021-10-13 | End: 2021-10-18

## 2021-10-13 RX ADMIN — DEXAMETHASONE 6 MG: 4 TABLET ORAL at 08:15

## 2021-10-13 RX ADMIN — Medication 2000 UNITS: at 08:15

## 2021-10-13 RX ADMIN — ENOXAPARIN SODIUM 40 MG: 40 INJECTION SUBCUTANEOUS at 08:16

## 2021-10-13 RX ADMIN — INSULIN LISPRO 2 UNITS: 100 INJECTION, SOLUTION INTRAVENOUS; SUBCUTANEOUS at 12:42

## 2021-10-13 RX ADMIN — HYDROCHLOROTHIAZIDE 12.5 MG: 25 TABLET ORAL at 08:15

## 2021-10-13 RX ADMIN — SODIUM CHLORIDE, PRESERVATIVE FREE 10 ML: 5 INJECTION INTRAVENOUS at 08:16

## 2021-10-13 RX ADMIN — PANTOPRAZOLE SODIUM 40 MG: 40 TABLET, DELAYED RELEASE ORAL at 06:38

## 2021-10-13 RX ADMIN — LISINOPRIL 20 MG: 20 TABLET ORAL at 08:16

## 2021-10-13 RX ADMIN — GUAIFENESIN 600 MG: 600 TABLET, EXTENDED RELEASE ORAL at 08:15

## 2021-10-13 ASSESSMENT — PAIN SCALES - GENERAL
PAINLEVEL_OUTOF10: 0

## 2021-10-13 NOTE — DISCHARGE SUMMARY
Hospital Medicine Discharge Summary      Patient ID: Derick Medina      Patient's PCP: No primary care provider on file. Admit Date: 10/10/2021     Discharge Date:   10/13/2021    Admitting Physician: Deanna Mendoza MD    Discharge Physician: Deanna Mendoza MD     Discharge Diagnoses: Active Hospital Problems    Diagnosis Date Noted    Hypoxia [R09.02] 10/13/2021    Essential hypertension [I10] 10/13/2021    Diabetes mellitus ([facility] [E11.9] 10/13/2021    Class 3 severe obesity with body mass index (BMI) of 45.0 to 49.9 in adult ([facility] [E66.01, Z68.42] 10/13/2021    Pneumonia due to COVID-19 virus [U07.1, J12.82] 10/10/2021         The patient was seen and examined on day of discharge and this discharge summary is in conjunction with any daily progress note from day of discharge. Hospital Course: The patient is a 57-year-old female with medical history of HTN and DM, not on prescription medications at home due to insurance coverage labs, presented with worsening shortness of breath. Patient has known COVID-19, tested positive on 10/07/2021, with beginning of symptoms several days prior. In the emergency room she was hypoxic and required admission. She was treated with Decadron, baricitinib, systemic antibiotics with azithromycin and ceftriaxone. Due to elevated D-dimer and she underwent CTPA and venous Doppler, which were negative for DVT/PE. Her condition slowly improved. Inflammatory markers were trending down. Oxygen requirements were stable, in fact patient came off O2 at the time of discharge, she had respiratory therapy evaluation for home O2, and did not need oxygen at home. On discharge she was prescribed remaining course of Decadron and antibiotics, along with some medications for blood pressure, diabetes. She was referred to [facility] outpatient medical clinic to establish PCP and also pulmonology for COVID-19 pneumonia follow-up.   She will continue isolation for 20 days total since onset of her symptoms. Consults:     IP CONSULT TO HOSPITALIST  IP CONSULT TO PHARMACY  IP CONSULT TO SPIRITUAL SERVICES    Disposition:  Home    Discharged Condition: Stable    Code Status: Full Code    Activity: activity as tolerated    Diet: regular diet    Follow Up: Primary Care Physician in one week    Exam:     General appearance: No apparent distress, appears stated age and cooperative. Lungs: bilateral rales present  Heart: Regular rate and rhythm with Normal S1/S2 without  murmurs, rubs or gallops, point of maximum impulse non-displaced  Abdomen: Soft, non-tender or non-distended without rigidity or guarding and positive bowel sounds all four quadrants. Extremities: No clubbing, cyanosis, or edema bilaterally. Full range of motion without deformity and normal gait intact. Skin: Skin color, texture, turgor normal.   Neurologic: Alert and oriented X 3, grossly non-focal.  Mental status: Alert, oriented, thought content appropriate      Labs:  For convenience and continuity at follow-up the following most recent labs are provided:    CBC:   Lab Results   Component Value Date    WBC 3.6 10/11/2021    HGB 12.3 10/11/2021    HCT 37.7 10/11/2021     10/11/2021       RENAL:   Lab Results   Component Value Date     10/13/2021    K 4.6 10/13/2021    K 4.4 10/11/2021     10/13/2021    CO2 23 10/13/2021    BUN 26 10/13/2021    CREATININE 0.6 10/13/2021           Discharge Medications:   Current Discharge Medication List           Details   lisinopril-hydroCHLOROthiazide (PRINZIDE;ZESTORETIC) 20-12.5 MG per tablet Take 1 tablet by mouth daily  Qty: 30 tablet, Refills: 1      cefdinir (OMNICEF) 300 MG capsule Take 1 capsule by mouth 2 times daily for 5 days  Qty: 10 capsule, Refills: 0      azithromycin (ZITHROMAX) 500 MG tablet Take 1 tablet by mouth daily for 2 days  Qty: 2 tablet, Refills: 0    Associated Diagnoses: Pneumonia due to COVID-19 virus      dexamethasone (DECADRON) 6 MG tablet Take 1 tablet by mouth daily for 6 doses  Qty: 6 tablet, Refills: 0      guaiFENesin (MUCINEX) 600 MG extended release tablet Take 1 tablet by mouth 2 times daily as needed for Congestion  Qty: 60 tablet, Refills: 0      metFORMIN (GLUCOPHAGE-XR) 500 MG extended release tablet Take 1 tablet by mouth daily (with breakfast)  Qty: 30 tablet, Refills: 0              Details   aspirin 81 MG chewable tablet Take 81 mg by mouth daily      ibuprofen (ADVIL;MOTRIN) 200 MG tablet Take 200 mg by mouth every 8 hours as needed for Pain                 Time Spent on discharge is more than 30 minutes in the examination, evaluation, counseling and review of medications and discharge plan. Signed:  Nasra Arango MD   10/13/2021      Thank you No primary care provider on file. for the opportunity to be involved in this patient's care. If you have any questions or concerns please feel free to contact me at [phone].

## 2021-10-13 NOTE — PROGRESS NOTES
RT Evaluation for Home Oxygen    Resting (Room Air):  SpO2:  93  HR:  50    Resting (On O2):  SpO2:   95 HR:  50  O2 Device:  nasal cannula  O2 Flow Rate (L/min):  1  FIO2 (%):    Comments:      During Walk (Room Air):  SpO2:  92-96%  HR:  54  Walk/Assistance Device:  no  Rate of Dyspnea:    Symptoms:  none  Comments:      After Walk:  SpO2:  93  HR:  56  O2 Device:  RA  O2 Flow Rate (L/min):  0  FIO2 (%):    Rate of Dyspnea:    Symptoms:    Comments:    Does the Patient Qualify for Home O2:  no  Liter Flow at Rest:    Liter Flow on Exertion:    Does the Patient Need Portable Oxygen Tanks:  no      Additional Comments:     Electronically signed by Deborah Childress RCP on 10/13/2021 at 12:36 PM

## 2021-10-13 NOTE — CARE COORDINATION
hospital at discharge, or pharmacy can deliver to the bedside if staff is available. (payment due at time of pick-up or delivery - cash, check, or card accepted)     Able to afford home medications/ co-pay costs: Yes    ADLS:  Current PT AM-PAC Score:   /24  Current OT AM-PAC Score:   /24      DISCHARGE Disposition: Home- No Services Needed    LOC at discharge: Not Applicable  MARCELLUS Completed: Not Indicated    Notification completed in HENS/PAS?:  Not Applicable    IMM Completed:   Not Indicated    Transportation:  Transportation PLAN for discharge: friend   Mode of Transport: Private [facility]:  [address] ordered at discharge: Not [address]: Not Applicable  Orders faxed: No    Durable Medical Equipment:  DME Provider: n/a  Equipment obtained during hospitalization: n/a    Home Oxygen and Respiratory Equipment:  Oxygen needed at discharge?: Not [address]: Not Applicable  Portable tank available for discharge?: Not Indicated        Referrals made at [facility] for outpatient continued care:  Not Applicable    Additional CM Notes:   Patient is from home alone, independent pta. Patient states she has friends and family if needed. Patient's friend to transport home.     The Plan for Transition of Care is related to the following treatment goals of Essential hypertension [I10]  Hypoxia [R09.02]  Type 2 diabetes mellitus with other specified complication, without long-term current use of insulin (HCC) [E11.69]  Class 3 severe obesity with body mass index (BMI) of 45.0 to 49.9 in adult, unspecified obesity type, unspecified whether serious comorbidity present ([facility] [E66.01, Z68.42]  COVID-19 virus infection [U07.1]  Pneumonia due to COVID-19 virus [U07.1, J12.82]    The Patient and/or patient representative Jocelyn Rousseau and her family were provided with a choice of provider and agrees with the discharge plan Yes    Freedom of choice list was provided with basic dialogue that supports the patient's individualized plan of care/goals and shares the quality data associated with the providers.  Yes    Care Transitions patient: No    Mata Bear RN  [facility]  Case Management Department  Ph: [phone]  Fax: [phone]

## 2021-10-13 NOTE — PLAN OF CARE
Problem: Gas Exchange - Impaired  Goal: Absence of hypoxia  10/13/2021 0514 by Hoang Khan RN  Outcome: Met This Shift  Patient weaned down 1L NC and sitting the the mid 90's throughout shift. Problem:  Body Temperature -  Risk of, Imbalanced  Goal: Ability to maintain a body temperature within defined limits  10/13/2021 0514 by Hoang Khan RN  Outcome: Met This Shift  Patient remained afebrile throughout shift

## 2021-10-14 ENCOUNTER — CARE COORDINATION (OUTPATIENT)
Dept: CASE MANAGEMENT | Age: 51
End: 2021-10-14

## 2021-10-14 NOTE — CARE COORDINATION
Patient contacted regarding COVID-19 diagnosis. Discussed COVID-19 related testing which was available at this time. Test results were positive. Patient informed of results, if available? Yes. Care Transition Nurse contacted the patient by telephone to perform post discharge assessment. Call within 2 business days of discharge: Yes. Verified name and  with patient as identifiers. Provided introduction to self, and explanation of the CTN/ACM role, and reason for call due to risk factors for infection and/or exposure to COVID-19. Symptoms reviewed with patient who verbalized the following symptoms: cough and shortness of breath. Due to no new or worsening symptoms encounter was not routed to provider for escalation. Discussed follow-up appointments. If no appointment was previously scheduled, appointment scheduling offered: Yes. [facility] follow up appointment(s): No future appointments. Non-face-to-face services provided:  Obtained and reviewed discharge summary and/or continuity of care documents  Education of patient/family/caregiver/guardian to support self-management-. Assessment and support for treatment adherence and medication management-. Advance Care Planning:   Does patient have an Advance Directive:  reviewed and current. Educated patient about risk for severe COVID-19 due to risk factors according to CDC guidelines. CTN reviewed discharge instructions, medical action plan and red flag symptoms with the patient who verbalized understanding. Discussed COVID vaccination status: No. Education provided on COVID-19 vaccination as appropriate. Discussed exposure protocols and quarantine with CDC Guidelines. Patient was given an opportunity to verbalize any questions and concerns and agrees to contact CTN or health care provider for questions related to their healthcare.     Reviewed and educated patient on any new and changed medications related to discharge diagnosis     Was patient discharged with a pulse oximeter? No Discussed and confirmed pulse oximeter discharge instructions and when to notify provider or seek emergency care. CTN spoke with patient this afternoon for initial 24 hour discharge follow up CTN call. Patient states she is still having some SOB/SOBE, and cough. Cough is productive with clear, dark at times, phlegm present. No reports of any nausea, vomiting, fevers, chills, dizziness or lightheadedness. Patient did not discharge with Home O2, did purchase a Pulse Oximetry device, O2 Saturation has been running 95% on RA. Patient instructed to continue to monitor for any of the above, other s/s, reporting to MD immediately. Patient also instructed to continue to monitor for any worsening cough or SOB/SOBE, reporting that to MD immediately, as well. Patient has all medications filled and in home. CTN provided contact information. Plan for follow-up call in 5-7 days based on severity of symptoms and risk factors.     Thank Cheyenne Olsen RN  Care Transition Coordinator  Contact [facility]:[phone]

## 2021-10-14 NOTE — CARE COORDINATION
Date/Time:  10/14/2021 1:01 PM  Attempted to reach patient by telephone. Call within 2 business days of discharge: Yes,  Left HIPPA compliant message requesting a return call. Will attempt to reach patient again.     Thank Jesus Alamo RN  Care Transition Coordinator  Contact [facility]:[phone]

## 2021-10-15 ENCOUNTER — NURSE TRIAGE (OUTPATIENT)
Dept: OTHER | Facility: CLINIC | Age: 51
End: 2021-10-15

## 2021-10-15 NOTE — TELEPHONE ENCOUNTER
Reason for Disposition   General information question, no triage required and triager able to answer question    Answer Assessment - Initial Assessment Questions  1. REASON FOR CALL or QUESTION: \"What is your reason for calling today? \" or \"How can I best help you? \" or \"What question do you have that I can help answer? \"        Patient is calling to schedule new patient appointment following discharge from the hospital where patient was diagnosed with COVID. Patient reports all symptoms have improved and denies any new or worsening symptoms. Protocols used: INFORMATION ONLY CALL - NO TRIAGE-ADULT-AH    Received call from [address] at [facility] with [employer]. Brief description of triage: See above note    Triage indicates for patient to: See disposition    Care advice provided, patient verbalizes understanding; denies any other questions or concerns; instructed to call back for any new or worsening symptoms. Writer provided warm transfer to Madeline Greenberg at [facility] for appointment scheduling. Attention Provider: Thank you for allowing me to participate in the care of your patient. The patient was connected to triage in response to information provided to the ECC/PSC. Please do not respond through this encounter as the response is not directed to a shared pool.

## 2021-10-18 ENCOUNTER — VIRTUAL VISIT (OUTPATIENT)
Dept: FAMILY MEDICINE CLINIC | Age: 51
End: 2021-10-18

## 2021-10-18 DIAGNOSIS — R73.03 PRE-DIABETES: ICD-10-CM

## 2021-10-18 DIAGNOSIS — Z76.89 ENCOUNTER TO ESTABLISH CARE: Primary | ICD-10-CM

## 2021-10-18 DIAGNOSIS — R06.03 RESPIRATORY DISTRESS: ICD-10-CM

## 2021-10-18 DIAGNOSIS — E66.01 MORBID OBESITY WITH BMI OF 45.0-49.9, ADULT (HCC): ICD-10-CM

## 2021-10-18 DIAGNOSIS — I10 HYPERTENSION, UNSPECIFIED TYPE: ICD-10-CM

## 2021-10-18 DIAGNOSIS — U07.1 COVID: ICD-10-CM

## 2021-10-18 PROCEDURE — 99203 OFFICE O/P NEW LOW 30 MIN: CPT | Performed by: NURSE PRACTITIONER

## 2021-10-18 SDOH — ECONOMIC STABILITY: FOOD INSECURITY: WITHIN THE PAST 12 MONTHS, YOU WORRIED THAT YOUR FOOD WOULD RUN OUT BEFORE YOU GOT MONEY TO BUY MORE.: NEVER TRUE

## 2021-10-18 SDOH — ECONOMIC STABILITY: HOUSING INSECURITY
IN THE LAST 12 MONTHS, WAS THERE A TIME WHEN YOU DID NOT HAVE A STEADY PLACE TO SLEEP OR SLEPT IN A SHELTER (INCLUDING NOW)?: NO

## 2021-10-18 SDOH — ECONOMIC STABILITY: TRANSPORTATION INSECURITY
IN THE PAST 12 MONTHS, HAS LACK OF TRANSPORTATION KEPT YOU FROM MEETINGS, WORK, OR FROM GETTING THINGS NEEDED FOR DAILY LIVING?: NO

## 2021-10-18 SDOH — ECONOMIC STABILITY: INCOME INSECURITY: IN THE LAST 12 MONTHS, WAS THERE A TIME WHEN YOU WERE NOT ABLE TO PAY THE MORTGAGE OR RENT ON TIME?: NO

## 2021-10-18 SDOH — ECONOMIC STABILITY: FOOD INSECURITY: WITHIN THE PAST 12 MONTHS, THE FOOD YOU BOUGHT JUST DIDN'T LAST AND YOU DIDN'T HAVE MONEY TO GET MORE.: NEVER TRUE

## 2021-10-18 SDOH — ECONOMIC STABILITY: TRANSPORTATION INSECURITY
IN THE PAST 12 MONTHS, HAS THE LACK OF TRANSPORTATION KEPT YOU FROM MEDICAL APPOINTMENTS OR FROM GETTING MEDICATIONS?: NO

## 2021-10-18 ASSESSMENT — PATIENT HEALTH QUESTIONNAIRE - PHQ9
SUM OF ALL RESPONSES TO PHQ QUESTIONS 1-9: 2
2. FEELING DOWN, DEPRESSED OR HOPELESS: 2
SUM OF ALL RESPONSES TO PHQ QUESTIONS 1-9: 2
SUM OF ALL RESPONSES TO PHQ9 QUESTIONS 1 & 2: 2
SUM OF ALL RESPONSES TO PHQ QUESTIONS 1-9: 2
1. LITTLE INTEREST OR PLEASURE IN DOING THINGS: 0

## 2021-10-18 ASSESSMENT — SOCIAL DETERMINANTS OF HEALTH (SDOH): HOW HARD IS IT FOR YOU TO PAY FOR THE VERY BASICS LIKE FOOD, HOUSING, MEDICAL CARE, AND HEATING?: NOT VERY HARD

## 2021-10-18 NOTE — PATIENT INSTRUCTIONS
Positive Covid diagnosed 10/10/2021x-ray and CTPA showed viral or bacterial pneumonia treated with steroids and antibiotic. Establish carereviewed with patient how to set up her physical and labs within the next month to complete her initial assessment. She stated she understood.

## 2021-10-18 NOTE — PROGRESS NOTES
Subjective:      Chief Complaint   Patient presents with   Adrienne Lacy Doctor    Positive For Covid-19     [employer] 10/10/21 - diagnosed covid pneumonia    Shortness of Breath       Patient ID: Marcelle Dang is a 46 y.o. female with PMH history of breast cancer, hypertension, diabetes type 2 ,morbid obesity BMI 45.61 and most recently positive Covid pneumonia. Diagnosed October 10 with Covid pneumonia, patient had a T-max of 102. Increased shortness of breath taken to the emergency room where she was treated and sent home. She is transitioning from [facility] to [facility] secondary to insurance change. She is doing well with Covid following her ED admission. She is able to eat and drink she does feel tired but she is not dyspneic anymore. She is almost finished with her antibiotic and steroids. She provides a fairly negative medical history while she is falling asleep talking to me. States her memory or brain fog has occurred since Covid.     HPI the above    Family History   Problem Relation Age of Onset    Breast Cancer Mother     Diabetes Father     Diabetes Paternal Grandmother        Social History     Socioeconomic History    Marital status: Single     Spouse name: Not on file    Number of children: Not on file    Years of education: Not on file    Highest education level: Not on file   Occupational History    Not on file   Tobacco Use    Smoking status: Never Smoker    Smokeless tobacco: Never Used   Vaping Use    Vaping Use: Unknown   Substance and Sexual Activity    Alcohol use: Never    Drug use: Never    Sexual activity: Not Currently     Partners: Male   Other Topics Concern    Not on file   Social History Narrative    Not on file     Social Determinants of Health     Financial Resource Strain: Low Risk     Difficulty of Paying Living Expenses: Not very hard   Food Insecurity: No Food Insecurity    Worried About Running Out of Food in the Last Year: Never true    Ran Out of Food in the Last Year: Never true   Transportation Needs: No Transportation Needs    Lack of Transportation (Medical): No    Lack of Transportation (Non-Medical): No   Physical Activity:     Days of Exercise per Week:     Minutes of Exercise per Session:    Stress:     Feeling of Stress :    Social Connections:     Frequency of Communication with Friends and Family:     Frequency of Social Gatherings with Friends and Family:     Attends [employer] Services:     Active Member of Clubs or Organizations:     Attends Club or Organization Meetings:     Marital Status:    Intimate Partner Violence:     Fear of Current or Ex-Partner:     Emotionally Abused:     Physically Abused:     Sexually Abused:        Current Outpatient Medications on File Prior to Visit   Medication Sig Dispense Refill    guaiFENesin (MUCINEX PO) Take by mouth      lisinopril-hydroCHLOROthiazide (PRINZIDE;ZESTORETIC) 20-12.5 MG per tablet Take 1 tablet by mouth daily 30 tablet 1    cefdinir (OMNICEF) 300 MG capsule Take 1 capsule by mouth 2 times daily for 5 days 10 capsule 0    dexamethasone (DECADRON) 6 MG tablet Take 1 tablet by mouth daily for 6 doses 6 tablet 0    metFORMIN (GLUCOPHAGE-XR) 500 MG extended release tablet Take 1 tablet by mouth daily (with breakfast) 30 tablet 0    aspirin 81 MG chewable tablet Take 81 mg by mouth daily       No current facility-administered medications on file prior to visit. Review of Systems   Constitutional: Positive for fatigue and fever. Negative for activity change, appetite change, chills, diaphoresis and unexpected weight change. Morbid obesity bmi 45.61   HENT: Negative. Negative for congestion, dental problem, drooling, ear discharge, ear pain, facial swelling, hearing loss, mouth sores, nosebleeds, postnasal drip, rhinorrhea, sinus pressure, sinus pain, sneezing, sore throat, tinnitus, trouble swallowing and voice change. Eyes: Negative.   Negative for photophobia, pain, discharge, redness, itching and visual disturbance. Respiratory: Negative. Negative for apnea, cough, choking, chest tightness, shortness of breath, wheezing and stridor. Covid pna   Cardiovascular: Negative. Negative for chest pain, palpitations and leg swelling. Htn-zestoretic   Gastrointestinal: Negative. Negative for abdominal distention, abdominal pain, anal bleeding, blood in stool, constipation, diarrhea, nausea, rectal pain and vomiting. Endocrine: Negative. Negative for cold intolerance, heat intolerance, polydipsia, polyphagia and polyuria. DM-metformin   Genitourinary: Negative. Negative for decreased urine volume, difficulty urinating, dyspareunia, dysuria, enuresis, flank pain, frequency, genital sores, hematuria, menstrual problem, pelvic pain, urgency, vaginal bleeding, vaginal discharge and vaginal pain. Musculoskeletal: Positive for arthralgias and joint swelling. Negative for back pain, gait problem, myalgias, neck pain and neck stiffness. Skin: Negative. Negative for color change, pallor, rash and wound. Allergic/Immunologic: Negative. Negative for environmental allergies, food allergies and immunocompromised state. Neurological: Negative. Negative for dizziness, tremors, seizures, syncope, facial asymmetry, speech difficulty, weakness, light-headedness, numbness and headaches. Hematological: Negative. Negative for adenopathy. Does not bruise/bleed easily. Hx br ca   Psychiatric/Behavioral: Negative. Negative for agitation, behavioral problems, confusion, decreased concentration, dysphoric mood, hallucinations, self-injury, sleep disturbance and suicidal ideas. The patient is not nervous/anxious and is not hyperactive. Objective:     Physical Exam  Constitutional:       Appearance: She is obese. HENT:      Head: Normocephalic and atraumatic.    Eyes:      Conjunctiva/sclera: Conjunctivae normal.   Pulmonary: Effort: Pulmonary effort is normal.   Musculoskeletal:      Cervical back: Normal range of motion and neck supple. Skin:     Capillary Refill: Capillary refill takes 2 to 3 seconds. Neurological:      Mental Status: She is alert and oriented to person, place, and time. Psychiatric:         Mood and Affect: Mood normal.         Behavior: Behavior normal.         Thought Content: Thought content normal.         Judgment: Judgment normal.         Assessment:     1. Encounter to establish care  Patient to have labs completed before physical  - COMPREHENSIVE METABOLIC PANEL; Future  - LIPID PANEL; Future  - TSH with Reflex; Future  - VITAMIN D 25 HYDROXY; Future  - CBC WITH AUTO DIFFERENTIAL; Future    2. Morbid obesity with BMI of 45.0-49.9, adult (HCC)  Weight reduction    3. Hypertension, unspecified type  Continue Zestoretic    4. Pre-diabetes  Need A1c    5. COVID    COVID-19 positive test (U07.1, COVID-19) with Acute Pneumonia (J12.89, Other viral pneumonia)  (If respiratory failure or sepsis present, add as separate assessment)        6.  Respiratory distress  From pneumonia, cefdinir and steroids per      Plan:     As above  Patient to make appointment for physical exam within 30 days

## 2021-10-19 ASSESSMENT — ENCOUNTER SYMPTOMS
STRIDOR: 0
ABDOMINAL DISTENTION: 0
EYE DISCHARGE: 0
ALLERGIC/IMMUNOLOGIC NEGATIVE: 1
TROUBLE SWALLOWING: 0
SINUS PRESSURE: 0
CHEST TIGHTNESS: 0
COUGH: 0
BLOOD IN STOOL: 0
EYE ITCHING: 0
ANAL BLEEDING: 0
RESPIRATORY NEGATIVE: 1
RHINORRHEA: 0
VOMITING: 0
SORE THROAT: 0
CHOKING: 0
WHEEZING: 0
SINUS PAIN: 0
GASTROINTESTINAL NEGATIVE: 1
SHORTNESS OF BREATH: 0
ABDOMINAL PAIN: 0
RECTAL PAIN: 0
VOICE CHANGE: 0
EYES NEGATIVE: 1
NAUSEA: 0
EYE PAIN: 0
CONSTIPATION: 0
BACK PAIN: 0
COLOR CHANGE: 0
APNEA: 0
PHOTOPHOBIA: 0
EYE REDNESS: 0
FACIAL SWELLING: 0
DIARRHEA: 0

## 2021-10-20 ENCOUNTER — CARE COORDINATION (OUTPATIENT)
Dept: CASE MANAGEMENT | Age: 51
End: 2021-10-20

## 2021-10-20 ENCOUNTER — TELEPHONE (OUTPATIENT)
Dept: FAMILY MEDICINE CLINIC | Age: 51
End: 2021-10-20

## 2021-10-20 NOTE — TELEPHONE ENCOUNTER
----- Message from Eugene Ibarra sent at 10/20/2021 12:34 PM EDT -----  Subject: Message to Provider    QUESTIONS  Information for Provider? The patient has developed a yeast infection from   all the antibiotics and steroids she's been taking and wanted to know if   doctor Sindi Delgado can call in something to help with that. Requesting call back   when sent in.  ---------------------------------------------------------------------------  --------------  CALL BACK INFO  What is the best way for the office to contact you? OK to leave message on   voicemail  Preferred Call Back Phone Number? [phone]  ---------------------------------------------------------------------------  --------------  SCRIPT ANSWERS  Relationship to Patient?  Self

## 2021-10-22 RX ORDER — FLUCONAZOLE 150 MG/1
150 TABLET ORAL ONCE
Qty: 1 TABLET | Refills: 1 | Status: SHIPPED | OUTPATIENT
Start: 2021-10-22 | End: 2021-10-22

## 2021-10-27 ENCOUNTER — HOSPITAL ENCOUNTER (INPATIENT)
Age: 51
LOS: 1 days | Discharge: HOME | DRG: 287 | End: 2021-10-29
Attending: EMERGENCY MEDICINE | Admitting: INTERNAL MEDICINE

## 2021-10-27 ENCOUNTER — APPOINTMENT (OUTPATIENT)
Dept: CT IMAGING | Age: 51
DRG: 287 | End: 2021-10-27

## 2021-10-27 DIAGNOSIS — Z86.16 HISTORY OF COVID-19: ICD-10-CM

## 2021-10-27 DIAGNOSIS — I10 ACCELERATED HYPERTENSION: ICD-10-CM

## 2021-10-27 DIAGNOSIS — R07.89 CHEST DISCOMFORT: Primary | ICD-10-CM

## 2021-10-27 PROBLEM — R07.9 CHEST PAIN: Status: ACTIVE | Noted: 2021-10-27

## 2021-10-27 LAB
A/G RATIO: 1.2 (ref 1.1–2.2)
ALBUMIN SERPL-MCNC: 3.8 G/DL (ref 3.4–5)
ALP BLD-CCNC: 71 U/L (ref 40–129)
ALT SERPL-CCNC: 38 U/L (ref 10–40)
ANION GAP SERPL CALCULATED.3IONS-SCNC: 10 MMOL/L (ref 3–16)
AST SERPL-CCNC: 23 U/L (ref 15–37)
BASOPHILS ABSOLUTE: 0.1 K/UL (ref 0–0.2)
BASOPHILS RELATIVE PERCENT: 2.2 %
BILIRUB SERPL-MCNC: <0.2 MG/DL (ref 0–1)
BUN BLDV-MCNC: 9 MG/DL (ref 7–20)
CALCIUM SERPL-MCNC: 9.4 MG/DL (ref 8.3–10.6)
CHLORIDE BLD-SCNC: 104 MMOL/L (ref 99–110)
CO2: 26 MMOL/L (ref 21–32)
CREAT SERPL-MCNC: 0.6 MG/DL (ref 0.6–1.1)
EKG ATRIAL RATE: 65 BPM
EKG DIAGNOSIS: NORMAL
EKG P AXIS: 47 DEGREES
EKG P-R INTERVAL: 164 MS
EKG Q-T INTERVAL: 384 MS
EKG QRS DURATION: 74 MS
EKG QTC CALCULATION (BAZETT): 399 MS
EKG R AXIS: 26 DEGREES
EKG T AXIS: -33 DEGREES
EKG VENTRICULAR RATE: 65 BPM
EOSINOPHILS ABSOLUTE: 0.1 K/UL (ref 0–0.6)
EOSINOPHILS RELATIVE PERCENT: 2.6 %
GFR AFRICAN AMERICAN: >60
GFR NON-AFRICAN AMERICAN: >60
GLOBULIN: 3.1 G/DL
GLUCOSE BLD-MCNC: 193 MG/DL (ref 70–99)
HCT VFR BLD CALC: 35.7 % (ref 36–48)
HEMOGLOBIN: 11.8 G/DL (ref 12–16)
LYMPHOCYTES ABSOLUTE: 1.1 K/UL (ref 1–5.1)
LYMPHOCYTES RELATIVE PERCENT: 21.1 %
MCH RBC QN AUTO: 25.7 PG (ref 26–34)
MCHC RBC AUTO-ENTMCNC: 33 G/DL (ref 31–36)
MCV RBC AUTO: 78 FL (ref 80–100)
MONOCYTES ABSOLUTE: 0.3 K/UL (ref 0–1.3)
MONOCYTES RELATIVE PERCENT: 6.4 %
NEUTROPHILS ABSOLUTE: 3.5 K/UL (ref 1.7–7.7)
NEUTROPHILS RELATIVE PERCENT: 67.7 %
PDW BLD-RTO: 16.4 % (ref 12.4–15.4)
PLATELET # BLD: 244 K/UL (ref 135–450)
PMV BLD AUTO: 9.8 FL (ref 5–10.5)
POTASSIUM REFLEX MAGNESIUM: 3.9 MMOL/L (ref 3.5–5.1)
PRO-BNP: 60 PG/ML (ref 0–124)
RBC # BLD: 4.57 M/UL (ref 4–5.2)
SODIUM BLD-SCNC: 140 MMOL/L (ref 136–145)
TOTAL PROTEIN: 6.9 G/DL (ref 6.4–8.2)
TROPONIN: <0.01 NG/ML
WBC # BLD: 5.2 K/UL (ref 4–11)

## 2021-10-27 PROCEDURE — G0378 HOSPITAL OBSERVATION PER HR: HCPCS

## 2021-10-27 PROCEDURE — 6360000004 HC RX CONTRAST MEDICATION: Performed by: EMERGENCY MEDICINE

## 2021-10-27 PROCEDURE — 84484 ASSAY OF TROPONIN QUANT: CPT

## 2021-10-27 PROCEDURE — 83880 ASSAY OF NATRIURETIC PEPTIDE: CPT

## 2021-10-27 PROCEDURE — 85025 COMPLETE CBC W/AUTO DIFF WBC: CPT

## 2021-10-27 PROCEDURE — 80053 COMPREHEN METABOLIC PANEL: CPT

## 2021-10-27 PROCEDURE — 71260 CT THORAX DX C+: CPT

## 2021-10-27 PROCEDURE — 6370000000 HC RX 637 (ALT 250 FOR IP): Performed by: EMERGENCY MEDICINE

## 2021-10-27 PROCEDURE — 93005 ELECTROCARDIOGRAM TRACING: CPT | Performed by: EMERGENCY MEDICINE

## 2021-10-27 PROCEDURE — 93010 ELECTROCARDIOGRAM REPORT: CPT | Performed by: INTERNAL MEDICINE

## 2021-10-27 PROCEDURE — 99283 EMERGENCY DEPT VISIT LOW MDM: CPT

## 2021-10-27 RX ORDER — INSULIN LISPRO 100 [IU]/ML
0-12 INJECTION, SOLUTION INTRAVENOUS; SUBCUTANEOUS
Status: DISCONTINUED | OUTPATIENT
Start: 2021-10-28 | End: 2021-10-29 | Stop reason: HOSPADM

## 2021-10-27 RX ORDER — INSULIN LISPRO 100 [IU]/ML
0-6 INJECTION, SOLUTION INTRAVENOUS; SUBCUTANEOUS NIGHTLY
Status: DISCONTINUED | OUTPATIENT
Start: 2021-10-28 | End: 2021-10-29 | Stop reason: HOSPADM

## 2021-10-27 RX ORDER — ONDANSETRON 4 MG/1
4 TABLET, ORALLY DISINTEGRATING ORAL EVERY 8 HOURS PRN
Status: DISCONTINUED | OUTPATIENT
Start: 2021-10-27 | End: 2021-10-29 | Stop reason: SDUPTHER

## 2021-10-27 RX ORDER — SODIUM CHLORIDE 0.9 % (FLUSH) 0.9 %
5-40 SYRINGE (ML) INJECTION PRN
Status: DISCONTINUED | OUTPATIENT
Start: 2021-10-27 | End: 2021-10-29 | Stop reason: SDUPTHER

## 2021-10-27 RX ORDER — DEXTROSE MONOHYDRATE 25 G/50ML
12.5 INJECTION, SOLUTION INTRAVENOUS PRN
Status: DISCONTINUED | OUTPATIENT
Start: 2021-10-27 | End: 2021-10-29 | Stop reason: HOSPADM

## 2021-10-27 RX ORDER — POLYETHYLENE GLYCOL 3350 17 G/17G
17 POWDER, FOR SOLUTION ORAL DAILY PRN
Status: DISCONTINUED | OUTPATIENT
Start: 2021-10-27 | End: 2021-10-29 | Stop reason: HOSPADM

## 2021-10-27 RX ORDER — ACETAMINOPHEN 500 MG
1000 TABLET ORAL ONCE
Status: COMPLETED | OUTPATIENT
Start: 2021-10-27 | End: 2021-10-27

## 2021-10-27 RX ORDER — NITROGLYCERIN 0.4 MG/1
0.4 TABLET SUBLINGUAL EVERY 5 MIN PRN
Status: DISCONTINUED | OUTPATIENT
Start: 2021-10-27 | End: 2021-10-29 | Stop reason: HOSPADM

## 2021-10-27 RX ORDER — LISINOPRIL AND HYDROCHLOROTHIAZIDE 20; 12.5 MG/1; MG/1
1 TABLET ORAL DAILY
Status: DISCONTINUED | OUTPATIENT
Start: 2021-10-28 | End: 2021-10-29 | Stop reason: HOSPADM

## 2021-10-27 RX ORDER — DEXTROSE MONOHYDRATE 50 MG/ML
100 INJECTION, SOLUTION INTRAVENOUS PRN
Status: DISCONTINUED | OUTPATIENT
Start: 2021-10-27 | End: 2021-10-29 | Stop reason: HOSPADM

## 2021-10-27 RX ORDER — ACETAMINOPHEN 325 MG/1
650 TABLET ORAL EVERY 6 HOURS PRN
Status: DISCONTINUED | OUTPATIENT
Start: 2021-10-27 | End: 2021-10-29 | Stop reason: SDUPTHER

## 2021-10-27 RX ORDER — SODIUM CHLORIDE 9 MG/ML
25 INJECTION, SOLUTION INTRAVENOUS PRN
Status: DISCONTINUED | OUTPATIENT
Start: 2021-10-27 | End: 2021-10-29 | Stop reason: SDUPTHER

## 2021-10-27 RX ORDER — ACETAMINOPHEN 650 MG/1
650 SUPPOSITORY RECTAL EVERY 6 HOURS PRN
Status: DISCONTINUED | OUTPATIENT
Start: 2021-10-27 | End: 2021-10-29 | Stop reason: SDUPTHER

## 2021-10-27 RX ORDER — MORPHINE SULFATE 2 MG/ML
2 INJECTION, SOLUTION INTRAMUSCULAR; INTRAVENOUS
Status: ACTIVE | OUTPATIENT
Start: 2021-10-27 | End: 2021-10-28

## 2021-10-27 RX ORDER — ASPIRIN 81 MG/1
81 TABLET, CHEWABLE ORAL DAILY
Status: DISCONTINUED | OUTPATIENT
Start: 2021-10-28 | End: 2021-10-29 | Stop reason: HOSPADM

## 2021-10-27 RX ORDER — ATORVASTATIN CALCIUM 40 MG/1
40 TABLET, FILM COATED ORAL NIGHTLY
Status: DISCONTINUED | OUTPATIENT
Start: 2021-10-28 | End: 2021-10-29 | Stop reason: HOSPADM

## 2021-10-27 RX ORDER — ASPIRIN 81 MG/1
324 TABLET, CHEWABLE ORAL ONCE
Status: COMPLETED | OUTPATIENT
Start: 2021-10-27 | End: 2021-10-27

## 2021-10-27 RX ORDER — NICOTINE POLACRILEX 4 MG
15 LOZENGE BUCCAL PRN
Status: DISCONTINUED | OUTPATIENT
Start: 2021-10-27 | End: 2021-10-29 | Stop reason: HOSPADM

## 2021-10-27 RX ORDER — SODIUM CHLORIDE 0.9 % (FLUSH) 0.9 %
5-40 SYRINGE (ML) INJECTION EVERY 12 HOURS SCHEDULED
Status: DISCONTINUED | OUTPATIENT
Start: 2021-10-28 | End: 2021-10-29 | Stop reason: SDUPTHER

## 2021-10-27 RX ORDER — ONDANSETRON 2 MG/ML
4 INJECTION INTRAMUSCULAR; INTRAVENOUS EVERY 6 HOURS PRN
Status: DISCONTINUED | OUTPATIENT
Start: 2021-10-27 | End: 2021-10-29 | Stop reason: SDUPTHER

## 2021-10-27 RX ADMIN — ACETAMINOPHEN 1000 MG: 500 TABLET, FILM COATED ORAL at 20:15

## 2021-10-27 RX ADMIN — ASPIRIN 324 MG: 81 TABLET, CHEWABLE ORAL at 15:52

## 2021-10-27 RX ADMIN — IOPAMIDOL 80 ML: 755 INJECTION, SOLUTION INTRAVENOUS at 16:31

## 2021-10-27 RX ADMIN — NITROGLYCERIN 1 INCH: 20 OINTMENT TOPICAL at 15:52

## 2021-10-27 ASSESSMENT — PAIN DESCRIPTION - FREQUENCY
FREQUENCY: CONTINUOUS
FREQUENCY: CONTINUOUS

## 2021-10-27 ASSESSMENT — PAIN DESCRIPTION - DESCRIPTORS
DESCRIPTORS: ACHING
DESCRIPTORS: ACHING

## 2021-10-27 ASSESSMENT — PAIN DESCRIPTION - PAIN TYPE
TYPE: ACUTE PAIN
TYPE: ACUTE PAIN

## 2021-10-27 ASSESSMENT — HEART SCORE: ECG: 1

## 2021-10-27 ASSESSMENT — PAIN SCALES - GENERAL
PAINLEVEL_OUTOF10: 4
PAINLEVEL_OUTOF10: 5
PAINLEVEL_OUTOF10: 5
PAINLEVEL_OUTOF10: 6

## 2021-10-27 ASSESSMENT — PAIN DESCRIPTION - ONSET: ONSET: ON-GOING

## 2021-10-27 ASSESSMENT — PAIN DESCRIPTION - LOCATION
LOCATION: HEAD;SHOULDER;CHEST
LOCATION: CHEST

## 2021-10-27 ASSESSMENT — PAIN DESCRIPTION - ORIENTATION
ORIENTATION: LEFT
ORIENTATION: LEFT

## 2021-10-27 NOTE — ED PROVIDER NOTES
[facility] Emergency Department    CHIEF COMPLAINT  Chief Complaint   Patient presents with    Chest Pain      HISTORY OF PRESENT ILLNESS  Suyapa Beck is a 46 y.o. female  who presents to the ED complaining of left sided chest pain radiating to the back onset 2 nights ago, waxes and wanes and worse today. Worse with deep inspiration but is not short of breath. No previous sx like it. Ibuprofen helped it initially but not anymore. She has a history of DM and HTN. Denies smoking history. No FHx of CAD. No leg swelling. She is not anticoagulated. Admitted a few weeks ago for COVID pneumonia, no fevers, no more coughing, not on oxygen. No abd pain vomiting or diarrhea. No other complaints, modifying factors or associated symptoms. I have reviewed the following from the nursing documentation.     Past Medical History:   Diagnosis Date    Hypertension      Past Surgical History:   Procedure Laterality Date    PARTIAL HYSTERECTOMY  12/2014    partial     Family History   Problem Relation Age of Onset    Breast Cancer Mother     Diabetes Father     Diabetes Paternal Grandmother      Social History     Socioeconomic History    Marital status: Single     Spouse name: Not on file    Number of children: Not on file    Years of education: Not on file    Highest education level: Not on file   Occupational History    Not on file   Tobacco Use    Smoking status: Never Smoker    Smokeless tobacco: Never Used   Vaping Use    Vaping Use: Unknown   Substance and Sexual Activity    Alcohol use: Never    Drug use: Never    Sexual activity: Not Currently     Partners: Male   Other Topics Concern    Not on file   Social History Narrative    Not on file     Social Determinants of Health     Financial Resource Strain: Low Risk     Difficulty of Paying Living Expenses: Not very hard   Food Insecurity: No Food Insecurity    Worried About Running Out of Food in the Last Year: Never true  Ran Out of Food in the Last Year: Never true   Transportation Needs: No Transportation Needs    Lack of Transportation (Medical): No    Lack of Transportation (Non-Medical): No   Physical Activity:     Days of Exercise per Week:     Minutes of Exercise per Session:    Stress:     Feeling of Stress :    Social Connections:     Frequency of Communication with Friends and Family:     Frequency of Social Gatherings with Friends and Family:     Attends Evangelical Services:     Active Member of Clubs or Organizations:     Attends Club or Organization Meetings:     Marital Status:    Intimate Partner Violence:     Fear of Current or Ex-Partner:     Emotionally Abused:     Physically Abused:     Sexually Abused:      No current facility-administered medications for this encounter. Current Outpatient Medications   Medication Sig Dispense Refill    lisinopril-hydroCHLOROthiazide (PRINZIDE;ZESTORETIC) 20-12.5 MG per tablet Take 1 tablet by mouth daily 30 tablet 1    metFORMIN (GLUCOPHAGE-XR) 500 MG extended release tablet Take 1 tablet by mouth daily (with breakfast) 30 tablet 0    aspirin 81 MG chewable tablet Take 81 mg by mouth daily       No Known Allergies    REVIEW OF SYSTEMS  10 systems reviewed, pertinent positives per HPI otherwise noted to be negative. PHYSICAL EXAM  BP (!) 165/79   Pulse 72   Temp 98.4 °F (36.9 °C) (Oral)   Resp 29   Ht 5' 3\" (1.6 m)   Wt 264 lb 4.8 oz (119.9 kg)   SpO2 100%   BMI 46.82 kg/m²    GENERAL APPEARANCE: Awake and alert. Cooperative. No distress. HENT: Normocephalic. Atraumatic. Mucous membranes are moist.  NECK: Supple. EYES: PERRL. EOM's grossly intact. HEART/CHEST: RRR. No murmurs. No chest wall tenderness. LUNGS: Respirations unlabored. CTAB. Good air exchange. Speaking comfortably in full sentences. ABDOMEN: No tenderness. Soft. Non-distended. No masses. No organomegaly. No guarding or rebound.  Normal bowel sounds throughout. MUSCULOSKELETAL: No extremity edema. Compartments soft. No deformity. No tenderness in the extremities. All extremities neurovascularly intact. SKIN: Warm and dry. No acute rashes. NEUROLOGICAL: Alert and oriented. CN's 2-12 intact. No gross facial drooping. Strength 5/5, sensation intact. 2 plus DTR's in knees bilaterally. Gait normal.  PSYCHIATRIC: Normal mood and affect. LABS  I have reviewed all labs for this visit.    Results for orders placed or performed during the hospital encounter of 10/27/21   CBC Auto Differential   Result Value Ref Range    WBC 5.2 4.0 - 11.0 K/uL    RBC 4.57 4.00 - 5.20 M/uL    Hemoglobin 11.8 (L) 12.0 - 16.0 g/dL    Hematocrit 35.7 (L) 36.0 - 48.0 %    MCV 78.0 (L) 80.0 - 100.0 fL    MCH 25.7 (L) 26.0 - 34.0 pg    MCHC 33.0 31.0 - 36.0 g/dL    RDW 16.4 (H) 12.4 - 15.4 %    Platelets 293 176 - 266 K/uL    MPV 9.8 5.0 - 10.5 fL    Neutrophils % 67.7 %    Lymphocytes % 21.1 %    Monocytes % 6.4 %    Eosinophils % 2.6 %    Basophils % 2.2 %    Neutrophils Absolute 3.5 1.7 - 7.7 K/uL    Lymphocytes Absolute 1.1 1.0 - 5.1 K/uL    Monocytes Absolute 0.3 0.0 - 1.3 K/uL    Eosinophils Absolute 0.1 0.0 - 0.6 K/uL    Basophils Absolute 0.1 0.0 - 0.2 K/uL   Comprehensive Metabolic Panel w/ Reflex to MG   Result Value Ref Range    Sodium 140 136 - 145 mmol/L    Potassium reflex Magnesium 3.9 3.5 - 5.1 mmol/L    Chloride 104 99 - 110 mmol/L    CO2 26 21 - 32 mmol/L    Anion Gap 10 3 - 16    Glucose 193 (H) 70 - 99 mg/dL    BUN 9 7 - 20 mg/dL    CREATININE 0.6 0.6 - 1.1 mg/dL    GFR Non-African American >60 >60    GFR African American >60 >60    Calcium 9.4 8.3 - 10.6 mg/dL    Total Protein 6.9 6.4 - 8.2 g/dL    Albumin 3.8 3.4 - 5.0 g/dL    Albumin/Globulin Ratio 1.2 1.1 - 2.2    Total Bilirubin <0.2 0.0 - 1.0 mg/dL    Alkaline Phosphatase 71 40 - 129 U/L    ALT 38 10 - 40 U/L    AST 23 15 - 37 U/L    Globulin 3.1 Not Established g/dL   Troponin   Result Value Ref Range Troponin <0.01 <0.01 ng/mL   Brain Natriuretic Peptide   Result Value Ref Range    Pro-BNP 60 0 - 124 pg/mL   EKG 12 Lead   Result Value Ref Range    Ventricular Rate 65 BPM    Atrial Rate 65 BPM    P-R Interval 164 ms    QRS Duration 74 ms    Q-T Interval 384 ms    QTc Calculation (Bazett) 399 ms    P Axis 47 degrees    R Axis 26 degrees    T Axis -33 degrees    Diagnosis       Normal sinus rhythm with sinus arrhythmiaNonspecific T wave abnormalityAbnormal ECGConfirmed by Anjana Salgado MD, Francesca Rizzo ([id number]) on 10/27/2021 5:07:30 PM     The 12 lead EKG was interpreted by me as follows:  Rate: normal with a rate of 65  Rhythm: sinus with sinus arrhythmia  Axis: normal  Intervals: normal NV, narrow QRS, normal QTc  ST segments: no ST elevations or depressions  T waves: no abnormal inversions  Non-specific T wave changes: present  Prior EKG comparison: EKG dated 10/10/21 is not significantly different    RADIOLOGY    XR CHEST (2 VW)    Result Date: 10/7/2021  EXAM: FRONTAL AND LATERAL CHEST RADIOGRAPH INDICATION: cough COMPARISON: None FINDINGS: LINES/TUBES:None HEART / MEDIASTINUM: Cardiomediastinal silhouette is unremarkable. PLEURAL SPACES: No large pleural effusion or pneumothorax. LUNGS: Few hazy opacities in the left lung base. BONES / SOFT TISSUES: No acute abnormality. OTHER: None. Few hazy opacities in the left lung base which could represent atelectasis but are nonspecific. XR CHEST PORTABLE    Result Date: 10/10/2021  PORTABLE CHEST. HISTORY: Covid positive COMPARISON STUDY: 10/7/2021 FINDINGS: Heart size and mediastinal structures appear within normal limits. Interval development of moderate patchy bilateral airspace disease consistent with pneumonia. Bony structures are intact. 1.  Moderate bilateral patchy airspace disease consistent with pneumonia. Atypical viral pneumonia should be considered.     CT CHEST PULMONARY EMBOLISM W CONTRAST    Result Date: 10/27/2021  EXAM: CT ANGIOGRAPHY CHEST WITH CONTRAST (PULMONARY EMBOLUS PROTOCOL) INDICATION: h/o COVID last month, now with  CP/pleurisy, COMPARISON: 10/10/2021 TECHNIQUE: Axial CT imaging obtained through the chest using CT pulmonary angiogram protocol. Axial images, multiplanar reformatted images and maximum intensity projection images were reviewed for CT angiographic technique. Individualized dose optimization technique was used in order to meet ALARA standards for radiation dose reduction. In addition to vendor specific dose reduction algorithms, the dose reduction techniques vary based on the specific scanner utilized but frequently include automated exposure control, adjustment of the mA and/or kV according to patient size, and use of iterative reconstruction technique. IV Contrast Media and volume: 80 mL Isovue-370 FINDINGS: DIAGNOSTIC QUALITY: Adequate. PULMONARY EMBOLI: None. RIGHT HEART STRAIN: None. LUNGS AND AIRWAYS: Airways are patent. Mild residual basilar groundglass airspace disease with significant improvement in multifocal opacification previously observed. Paullette Rakes PLEURA: No pleural effusions or significant pleural thickening. HEART / GREAT VESSELS: Normal heart size. No pericardial effusion. Great vessels are normal caliber. ADENOPATHY: None. CHEST WALL / LOWER NECK: No significant abnormality. UPPER ABDOMEN: Normal. BONES: Spondylosis, osteophytes without destructive lesion. 1. No evidence of acute or chronic pulmonary embolus   2. Radiographic improvement with mild residual bilateral basilar groundglass airspace disease and significant resolution of the mid and upper lung field patchy regions of groundglass consolidation. CTA PULMONARY W CONTRAST    Result Date: 10/10/2021  EXAM: CT ANGIOGRAPHY CHEST WITH CONTRAST (PULMONARY EMBOLUS PROTOCOL) INDICATION: elevated D-dimer, COVID pneumonia, hypoxia, COMPARISON: None. TECHNIQUE: Axial CT imaging obtained through the chest using CT pulmonary angiogram protocol.  Axial images, multiplanar reformatted images and maximum intensity projection images were reviewed. CT radiation dose optimization techniques (automated exposure control, and use of iterative reconstruction techniques, or adjustment of the mA and/or kV according to patient size) were used to limit patient radiation dose. IV Contrast Media and volume: 80 mL Isovue-370 FINDINGS: DIAGNOSTIC QUALITY: Suboptimal due to respiratory motion. PULMONARY EMBOLI: No evidence for pulmonary emboli RIGHT HEART STRAIN: None. LUNGS AND AIRWAYS: Airways are patent. There are bilateral patchy groundglass opacities, many which demonstrate a peribronchial/central distribution. There is also some peripheral lung involvement. PLEURA: No pleural effusions or significant pleural thickening. HEART / GREAT VESSELS: Normal. ADENOPATHY: There is mild mediastinal lymphadenopathy. CHEST WALL / LOWER NECK: No acute abnormality UPPER ABDOMEN: No acute abnormality BONES: No significant abnormality. OTHER FINDINGS: None. 1. No evidence of pulmonary embolus.   2. Bilateral airspace disease compatible with viral or multifocal bacterial pneumonia     VL Extremity Venous Bilateral    Result Date: 10/12/2021  Lower Extremities DVT Study  Demographics   Patient Name      Diamond Paul   Date of Study     10/11/2021          Gender              Female   Patient Number    [id number]          Date of Birth       1970   Visit Number      [id number]           Age                 46 year(s)   Accession Number  [id number]          Room Number         9405   Corporate ID      [id number]            Sonographer         Luz Lao RDMS,                                                            RVT   Ordering          Noe Sung MD  Interpreting        [facility]  Physician                             Physician           Readers                                                            Tawnya Zavala Annette Abernathy MD  Procedure Type of Study:   Veins:Lower Extremities DVT Study, VASC EXTREMITY VENOUS DUPLEX BILATERAL. Vascular Sonographer Report  Additional Indications:Patient presents with hypoxia and Covid pneumonia. Venous Duplex Scan: B-mode imaging of the deep and superficial veins, with compression maneuvers, including color and Doppler spectral waveform analysis. Impressions Right Impression There is no evidence of deep or superficial venous thrombosis involving the right lower extremity. Left Impression There is no evidence of deep or superficial venous thrombosis involving the left lower extremity. There is no previous exam for comparison. Conclusions   Summary   Normal venous duplex study of the bilateral lower extremities. There is no  evidence of deep or superficial venous thrombosis. Signature   ------------------------------------------------------------------  Electronically signed by Carrie Alvarado MD (Interpreting  physician) on 10/12/2021 at 06:03 PM  ------------------------------------------------------------------  Patient Status:Routine. Study [facility] - Vascular Lab. Technical Quality:Adequate visualization. Velocities are measured in cm/s ; Diameters are measured in mm Right Lower Extremities DVT Study Measurements Right 2D Measurements +------------------------+----------+---------------+----------+ ! Location                ! Visualized! Compressibility! Thrombosis! +------------------------+----------+---------------+----------+ ! Sapheno Femoral Junction! Yes       ! Yes            ! None      ! +------------------------+----------+---------------+----------+ ! GSV Thigh               ! Yes       ! Yes            ! None      ! +------------------------+----------+---------------+----------+ ! Common Femoral          !Yes       ! Yes            ! None      ! +------------------------+----------+---------------+----------+ ! Femoral                 !Yes       ! Yes !None      ! +------------------------+----------+---------------+----------+ ! Prox Femoral            !Yes       ! Yes            ! None      ! +------------------------+----------+---------------+----------+ ! Mid Femoral             !Yes       ! Yes            ! None      ! +------------------------+----------+---------------+----------+ ! Dist Femoral            !Yes       ! Yes            ! None      ! +------------------------+----------+---------------+----------+ ! Deep Femoral            !Yes       ! Yes            ! None      ! +------------------------+----------+---------------+----------+ ! Popliteal               !Yes       ! Yes            ! None      ! +------------------------+----------+---------------+----------+ ! GSV Below Knee          ! Yes       ! Yes            ! None      ! +------------------------+----------+---------------+----------+ ! Gastroc                 ! Yes       ! Yes            ! None      ! +------------------------+----------+---------------+----------+ ! Soleal                  !Yes       ! Yes            ! None      ! +------------------------+----------+---------------+----------+ ! PTV                     ! Yes       ! Yes            ! None      ! +------------------------+----------+---------------+----------+ ! Peroneal                !Yes       ! Yes            ! None      ! +------------------------+----------+---------------+----------+ ! GSV Calf                ! Yes       ! Yes            ! None      ! +------------------------+----------+---------------+----------+ ! SSV                     ! Yes       ! Yes            ! None      ! +------------------------+----------+---------------+----------+ Right Doppler Measurements +------------------------+------+------+------------+ ! Location                ! Signal!Reflux! Reflux (sec)! +------------------------+------+------+------------+ ! Sapheno Femoral Junction! Phasic!      !            ! +------------------------+------+------+------------+ ! Common Femoral          !Phasic!      !            ! +------------------------+------+------+------------+ ! Femoral                 !Phasic!      !            ! +------------------------+------+------+------------+ ! Deep Femoral            !Phasic!      !            ! +------------------------+------+------+------------+ ! Popliteal               !Phasic!      !            ! +------------------------+------+------+------------+ Left Lower Extremities DVT Study Measurements Left 2D Measurements +------------------------+----------+---------------+----------+ ! Location                ! Visualized! Compressibility! Thrombosis! +------------------------+----------+---------------+----------+ ! Sapheno Femoral Junction! Yes       ! Yes            ! None      ! +------------------------+----------+---------------+----------+ ! GSV Thigh               ! Yes       ! Yes            ! None      ! +------------------------+----------+---------------+----------+ ! Common Femoral          !Yes       ! Yes            ! None      ! +------------------------+----------+---------------+----------+ ! Femoral                 !Yes       ! Yes            ! None      ! +------------------------+----------+---------------+----------+ ! Prox Femoral            !Yes       ! Yes            ! None      ! +------------------------+----------+---------------+----------+ ! Mid Femoral             !Yes       ! Yes            ! None      ! +------------------------+----------+---------------+----------+ ! Dist Femoral            !Yes       ! Yes            ! None      ! +------------------------+----------+---------------+----------+ ! Deep Femoral            !Yes       ! Yes            ! None      ! +------------------------+----------+---------------+----------+ ! Popliteal               !Yes       ! Yes            ! None      ! +------------------------+----------+---------------+----------+ ! GSV Below Knee          ! Yes       ! Yes            ! None      ! +------------------------+----------+---------------+----------+ ! Gastroc                 ! Yes       ! Yes            ! None      ! +------------------------+----------+---------------+----------+ ! Soleal                  !Yes       ! Yes            ! None      ! +------------------------+----------+---------------+----------+ ! PTV                     ! Yes       ! Yes            ! None      ! +------------------------+----------+---------------+----------+ ! Peroneal                !Yes       ! Yes            ! None      ! +------------------------+----------+---------------+----------+ ! GSV Calf                ! Yes       ! Yes            ! None      ! +------------------------+----------+---------------+----------+ ! SSV                     ! Yes       ! Yes            ! None      ! +------------------------+----------+---------------+----------+ Left Doppler Measurements +------------------------+------+------+------------+ ! Location                ! Signal!Reflux! Reflux (sec)! +------------------------+------+------+------------+ ! Sapheno Femoral Junction! Phasic!      !            ! +------------------------+------+------+------------+ ! Common Femoral          !Phasic!      !            ! +------------------------+------+------+------------+ ! Femoral                 !Phasic!      !            ! +------------------------+------+------+------------+ ! Deep Femoral            !Phasic!      !            ! +------------------------+------+------+------------+ ! Popliteal               !Phasic!      !            ! +------------------------+------+------+------------+      ED COURSE/MDM  Patient seen and evaluated. Old records reviewed. Labs and imaging reviewed and results discussed with patient.       After initial evaluation, differential diagnostic considerations included: acute coronary syndrome, pulmonary embolism, COPD/asthma, pneumonia, musculoskeletal, reflux/PUD/gastritis, pneumothorax, CHF, thoracic aortic dissection, anxiety    The patient's ED workup was notable for concern for left-sided chest discomfort which is a new problem over the past 2 days separate from her past admission for COVID-19 which overall appears to be doing much better both clinically and on imaging today. No PE identified on CT imaging today. Initial troponin negative, EKG nonischemic but nonspecific and heart score is 5. Patient was given aspirin and some nitroglycerin paste. Patient was notably hypertensive initially at 713T systolic suggesting the possibility of accelerated hypertension playing a role as well. I do feel that the patient warrants ACS rule out based on heart score and lack of other alternative etiology of her chest pain identified at this time. HEART SCORE:    History: 1  EC  Patient Age: 1  *Risk factors for Atherosclerotic disease: Hypertension;Obesity;Diabetes Mellitus  Risk Factors: 2  Troponin: 0  Heart Score Total: 5      Heart score: 5. This falls under the following category: Score of 4-6, which indicates low/moderate risk for major adverse cardiac event and supports observation with repeated troponins and/or non-invasive testing    During the patient's ED course, the patient was given:  Medications   aspirin chewable tablet 324 mg (324 mg Oral Given 10/27/21 155)   nitroglycerin (NITRO-BID) 2 % ointment 1 inch (1 inch Topical Given 10/27/21 1552)   iopamidol (ISOVUE-370) 76 % injection 80 mL (80 mLs IntraVENous Given 10/27/21 1631)        CLINICAL IMPRESSION  1. Chest discomfort    2. Accelerated hypertension    3. History of COVID-19        Blood pressure (!) 165/79, pulse 72, temperature 98.4 °F (36.9 °C), temperature source Oral, resp. rate 29, height 5' 3\" (1.6 m), weight 264 lb 4.8 oz (119.9 kg), SpO2 100 %, not currently breastfeeding. DISPOSITION  Micki Guillermo was admitted in fair condition. The plan is to admit to the hospital at this time under the hospitalist service.   Hospitalist accepted the patient and will take over the patient's care. DISCLAIMER: This chart was created using Dragon dictation software. Efforts were made by me to ensure accuracy, however some errors may be present due to limitations of this technology and occasionally words are not transcribed correctly.         Angelika Patel MD  10/27/21 88

## 2021-10-27 NOTE — ED TRIAGE NOTES
48y/o female presents to the ED with left side c/p onset last night. Pt states that she took ibuprofen with minimal relief. -n/f/v/c/d. -sob. Denies numbness/tingling.

## 2021-10-28 LAB
CHOLESTEROL, TOTAL: 187 MG/DL (ref 0–199)
EKG ATRIAL RATE: 69 BPM
EKG DIAGNOSIS: NORMAL
EKG P AXIS: 60 DEGREES
EKG P-R INTERVAL: 152 MS
EKG Q-T INTERVAL: 382 MS
EKG QRS DURATION: 66 MS
EKG QTC CALCULATION (BAZETT): 409 MS
EKG R AXIS: 41 DEGREES
EKG T AXIS: 55 DEGREES
EKG VENTRICULAR RATE: 69 BPM
ESTIMATED AVERAGE GLUCOSE: 165.7 MG/DL
GLUCOSE BLD-MCNC: 116 MG/DL (ref 70–99)
GLUCOSE BLD-MCNC: 133 MG/DL (ref 70–99)
GLUCOSE BLD-MCNC: 138 MG/DL (ref 70–99)
GLUCOSE BLD-MCNC: 142 MG/DL (ref 70–99)
GLUCOSE BLD-MCNC: 144 MG/DL (ref 70–99)
HBA1C MFR BLD: 7.4 %
HCG QUALITATIVE: NEGATIVE
HDLC SERPL-MCNC: 37 MG/DL (ref 40–60)
LDL CHOLESTEROL CALCULATED: 129 MG/DL
LV EF: 88 %
LVEF MODALITY: NORMAL
PERFORMED ON: ABNORMAL
TRIGL SERPL-MCNC: 103 MG/DL (ref 0–150)
TROPONIN: <0.01 NG/ML
VLDLC SERPL CALC-MCNC: 21 MG/DL

## 2021-10-28 PROCEDURE — 78452 HT MUSCLE IMAGE SPECT MULT: CPT

## 2021-10-28 PROCEDURE — A9502 TC99M TETROFOSMIN: HCPCS | Performed by: INTERNAL MEDICINE

## 2021-10-28 PROCEDURE — G0378 HOSPITAL OBSERVATION PER HR: HCPCS

## 2021-10-28 PROCEDURE — 93005 ELECTROCARDIOGRAM TRACING: CPT | Performed by: INTERNAL MEDICINE

## 2021-10-28 PROCEDURE — 6360000002 HC RX W HCPCS: Performed by: INTERNAL MEDICINE

## 2021-10-28 PROCEDURE — 2580000003 HC RX 258: Performed by: INTERNAL MEDICINE

## 2021-10-28 PROCEDURE — 36415 COLL VENOUS BLD VENIPUNCTURE: CPT

## 2021-10-28 PROCEDURE — 93017 CV STRESS TEST TRACING ONLY: CPT

## 2021-10-28 PROCEDURE — 3430000000 HC RX DIAGNOSTIC RADIOPHARMACEUTICAL: Performed by: INTERNAL MEDICINE

## 2021-10-28 PROCEDURE — 6370000000 HC RX 637 (ALT 250 FOR IP): Performed by: INTERNAL MEDICINE

## 2021-10-28 PROCEDURE — 80061 LIPID PANEL: CPT

## 2021-10-28 PROCEDURE — 93010 ELECTROCARDIOGRAM REPORT: CPT | Performed by: INTERNAL MEDICINE

## 2021-10-28 PROCEDURE — 84703 CHORIONIC GONADOTROPIN ASSAY: CPT

## 2021-10-28 PROCEDURE — 84484 ASSAY OF TROPONIN QUANT: CPT

## 2021-10-28 PROCEDURE — 96372 THER/PROPH/DIAG INJ SC/IM: CPT

## 2021-10-28 PROCEDURE — 83036 HEMOGLOBIN GLYCOSYLATED A1C: CPT

## 2021-10-28 RX ORDER — 0.9 % SODIUM CHLORIDE 0.9 %
10 VIAL (ML) INJECTION
Status: COMPLETED | OUTPATIENT
Start: 2021-10-28 | End: 2021-10-28

## 2021-10-28 RX ORDER — IBUPROFEN 200 MG
400 TABLET ORAL
Status: DISCONTINUED | OUTPATIENT
Start: 2021-10-28 | End: 2021-10-29 | Stop reason: HOSPADM

## 2021-10-28 RX ADMIN — TETROFOSMIN 30 MILLICURIE: 1.38 INJECTION, POWDER, LYOPHILIZED, FOR SOLUTION INTRAVENOUS at 11:13

## 2021-10-28 RX ADMIN — INSULIN GLARGINE 5 UNITS: 100 INJECTION, SOLUTION SUBCUTANEOUS at 20:51

## 2021-10-28 RX ADMIN — SODIUM CHLORIDE, PRESERVATIVE FREE 10 ML: 5 INJECTION INTRAVENOUS at 08:27

## 2021-10-28 RX ADMIN — SODIUM CHLORIDE, PRESERVATIVE FREE 10 ML: 5 INJECTION INTRAVENOUS at 21:42

## 2021-10-28 RX ADMIN — INSULIN LISPRO 2 UNITS: 100 INJECTION, SOLUTION INTRAVENOUS; SUBCUTANEOUS at 12:50

## 2021-10-28 RX ADMIN — Medication 10 ML: at 11:12

## 2021-10-28 RX ADMIN — ATORVASTATIN CALCIUM 40 MG: 40 TABLET, FILM COATED ORAL at 00:52

## 2021-10-28 RX ADMIN — ATORVASTATIN CALCIUM 40 MG: 40 TABLET, FILM COATED ORAL at 20:51

## 2021-10-28 RX ADMIN — ACETAMINOPHEN 650 MG: 325 TABLET ORAL at 04:37

## 2021-10-28 RX ADMIN — INSULIN GLARGINE 15 UNITS: 100 INJECTION, SOLUTION SUBCUTANEOUS at 00:51

## 2021-10-28 RX ADMIN — IBUPROFEN 400 MG: 200 TABLET, FILM COATED ORAL at 17:35

## 2021-10-28 RX ADMIN — REGADENOSON 0.4 MG: 0.08 INJECTION, SOLUTION INTRAVENOUS at 11:13

## 2021-10-28 RX ADMIN — ENOXAPARIN SODIUM 40 MG: 100 INJECTION SUBCUTANEOUS at 08:26

## 2021-10-28 RX ADMIN — ASPIRIN 81 MG: 81 TABLET, CHEWABLE ORAL at 08:26

## 2021-10-28 RX ADMIN — INSULIN LISPRO 2 UNITS: 100 INJECTION, SOLUTION INTRAVENOUS; SUBCUTANEOUS at 08:28

## 2021-10-28 RX ADMIN — LISINOPRIL AND HYDROCHLOROTHIAZIDE 1 TABLET: 12.5; 2 TABLET ORAL at 08:26

## 2021-10-28 ASSESSMENT — PAIN SCALES - GENERAL
PAINLEVEL_OUTOF10: 0
PAINLEVEL_OUTOF10: 4
PAINLEVEL_OUTOF10: 6
PAINLEVEL_OUTOF10: 6

## 2021-10-28 ASSESSMENT — PAIN DESCRIPTION - PAIN TYPE
TYPE: ACUTE PAIN
TYPE: ACUTE PAIN

## 2021-10-28 ASSESSMENT — PAIN DESCRIPTION - ONSET
ONSET: ON-GOING
ONSET: ON-GOING

## 2021-10-28 ASSESSMENT — PAIN DESCRIPTION - ORIENTATION
ORIENTATION: LEFT
ORIENTATION: LEFT

## 2021-10-28 ASSESSMENT — PAIN DESCRIPTION - PROGRESSION: CLINICAL_PROGRESSION: NOT CHANGED

## 2021-10-28 ASSESSMENT — PAIN DESCRIPTION - FREQUENCY
FREQUENCY: CONTINUOUS
FREQUENCY: CONTINUOUS

## 2021-10-28 ASSESSMENT — PAIN DESCRIPTION - LOCATION
LOCATION: HEAD;SHOULDER
LOCATION: CHEST;SHOULDER

## 2021-10-28 ASSESSMENT — PAIN - FUNCTIONAL ASSESSMENT: PAIN_FUNCTIONAL_ASSESSMENT: ACTIVITIES ARE NOT PREVENTED

## 2021-10-28 ASSESSMENT — PAIN DESCRIPTION - DESCRIPTORS
DESCRIPTORS: ACHING
DESCRIPTORS: ACHING

## 2021-10-28 NOTE — ED NOTES
Report called to [place] at [facility] and informed squad is here for transport.       Patience Mcknight RN  10/27/21 1557

## 2021-10-28 NOTE — PLAN OF CARE
Problem: Pain:  Description: Pain management should include both nonpharmacologic and pharmacologic interventions. Goal: Pain level will decrease  10/28/2021 1024 by Amy Johnson RN  Note: Patient pain has been controlled during shift with PRN pain medication as well as ice pack. Will continue to monitor. 10/28/2021 0415 by Herman aWtts RN  Outcome: Met This Shift     Problem: Safety:  Goal: Free from accidental physical injury  Note: Patient has remained free from falls or any injury during shift. Patient continues to be UAL.

## 2021-10-28 NOTE — PROGRESS NOTES
10/28/2021 1303  Gross per 24 hour   Intake 200 ml   Output --   Net 200 ml       Physical Exam Performed:    BP (!) 151/82   Pulse 76   Temp 97.3 °F (36.3 °C) (Oral)   Resp 16   Ht 5' 3\" (1.6 m)   Wt 264 lb 4.8 oz (119.9 kg)   SpO2 97%   BMI 46.82 kg/m²     General appearance: No apparent distress, appears stated age and cooperative. HEENT: Pupils equal, round, and reactive to light. Conjunctivae/corneas clear. Neck: Supple, with full range of motion. No jugular venous distention. Trachea midline. Respiratory:  Normal respiratory effort. Clear to auscultation, bilaterally without Rales/Wheezes/Rhonchi. Cardiovascular: Regular rate and rhythm with normal S1/S2 without murmurs, rubs or gallops. Abdomen: Soft, non-tender, non-distended with normal bowel sounds. Musculoskeletal: No clubbing, cyanosis or edema bilaterally. Full range of motion without deformity. Skin: Skin color, texture, turgor normal.  No rashes or lesions. Neurologic:  Neurovascularly intact without any focal sensory/motor deficits. Cranial nerves: II-XII intact, grossly non-focal.  Psychiatric: Alert and oriented, thought content appropriate, normal insight  Capillary Refill: Brisk,< 3 seconds   Peripheral Pulses: +2 palpable, equal bilaterally       Labs:   Recent Labs     10/27/21  1549   WBC 5.2   HGB 11.8*   HCT 35.7*        Recent Labs     10/27/21  1548      K 3.9      CO2 26   BUN 9   CREATININE 0.6   CALCIUM 9.4     Recent Labs     10/27/21  1548   AST 23   ALT 38   BILITOT <0.2   ALKPHOS 71     No results for input(s): INR in the last 72 hours. Recent Labs     10/27/21  1548 10/28/21  0031   TROPONINI <0.01 <0.01       Urinalysis:      Lab Results   Component Value Date    NITRU Negative 10/07/2021    BLOODU Negative 10/07/2021    SPECGRAV <=1.005 10/07/2021    GLUCOSEU Negative 10/07/2021       Radiology:  CT CHEST PULMONARY EMBOLISM W CONTRAST   Final Result   1.  No evidence of acute or chronic

## 2021-10-28 NOTE — PROGRESS NOTES
Pt transferred from [facility] ED to Room # [id number] on 5S @ 2308. A&Ox4, VSS. Oriented to room. Overside table and call light within reach, bed in lowest position. No C/o pain. Patient in bed sleeping. Pt needs satsified at this time. Will con't to monitor.

## 2021-10-28 NOTE — ED NOTES
Pt to [facility] in stable condition per [facility].       Quyen Connor RN  10/27/21 [id number]

## 2021-10-28 NOTE — PROGRESS NOTES
Patient is A&OX4. VSS. Only patient complaint is headache which was resolved with ice pack. Patient continues to be NPO for cardiac stress test this morning. All patient needs are met at this time. Patient is resting comfortably in chair. Will continue to monitor.      Electronically signed by Roger Savage RN on 10/28/2021 at 11:00 AM

## 2021-10-28 NOTE — PROGRESS NOTES
Patient will need resting pictures tomorrow to complete nuclear stress test imaging. Patient completed stress portion today.

## 2021-10-28 NOTE — H&P
[facility] Medicine History & Physical      PCP: Altagracia Hill APRN - CNP    Date of Admission: 10/27/2021    Date of Service: Pt seen/examined on 10/27/2021. Placed in Observation. Chief Complaint: Chest pain      History Of Present Illness:      46 y.o. female who presents with complaints of retrosternal and left-sided chest pain radiating to her left shoulder and the back that started on Tuesday at rest.  The pain worsens with deep breathing. Patient took ibuprofen which relieved the pain. Patient had another similar episode of chest pain earlier today which was also relieved by taking ibuprofen, but patient got concerned and came to ED. Patient denies any fever, chills, shortness of breath, nausea, vomiting, diaphoresis, palpitations. Patient was hospitalized here from 10/10 through 10/13 for Covid pneumonia, tested positive on 10/7/2021. Patient was treated with Decadron, baricitinib and systemic antibiotics. At that time patient underwent CTPA and venous Doppler due to elevated D-dimers. Did not reveal any acute PE/DVT. Patient has completed 20 days of isolation since onset of her symptoms. Past Medical History:          Diagnosis Date    Hypertension        Past Surgical History:          Procedure Laterality Date    PARTIAL HYSTERECTOMY  12/2014    partial       Medications Prior to Admission:      Prior to Admission medications    Medication Sig Start Date End Date Taking? Authorizing Provider   lisinopril-hydroCHLOROthiazide (PRINZIDE;ZESTORETIC) 20-12.5 MG per tablet Take 1 tablet by mouth daily 10/13/21   Rebeka Zarate MD   metFORMIN (GLUCOPHAGE-XR) 500 MG extended release tablet Take 1 tablet by mouth daily (with breakfast) 10/13/21   Rebeka Zarate MD   aspirin 81 MG chewable tablet Take 81 mg by mouth daily    Historical Provider, MD       Allergies:  Patient has no known allergies. Social History:      TOBACCO:   reports that she has never smoked.  She has never used smokeless tobacco.  ETOH:   reports no history of alcohol use. E-Cigarettes/Vaping Use     Questions Responses    E-Cigarette/Vaping Use Unknown If Ever Used    Start Date     Passive Exposure     Quit Date     Counseling Given     Comments             Family History:    Reviewed in detail and negative for DM, CAD, Cancer, CVA. Positive as follows:        Problem Relation Age of Onset    Breast Cancer Mother     Diabetes Father     Diabetes Paternal Grandmother        REVIEW OF SYSTEMS COMPLETED:   Pertinent positives as noted in the HPI. All other systems reviewed and negative. PHYSICAL EXAM PERFORMED:    BP (!) 152/81   Pulse 70   Temp 98.3 °F (36.8 °C) (Oral)   Resp 30   Ht 5' 3\" (1.6 m)   Wt 264 lb 4.8 oz (119.9 kg)   SpO2 96%   BMI 46.82 kg/m²     General appearance:  No apparent distress, appears stated age and cooperative. Morbidly obese  HEENT:  Normal cephalic, atraumatic without obvious deformity. Pupils equal, round, and reactive to light. Extra ocular muscles intact. Conjunctivae/corneas clear. Neck: Supple, with full range of motion. No jugular venous distention. Trachea midline. Respiratory:  Normal respiratory effort. Clear to auscultation, bilaterally without Rales/Wheezes/Rhonchi. Cardiovascular:  Regular rate and rhythm with normal S1/S2 without murmurs, rubs or gallops. Abdomen: Soft, non-tender, non-distended with normal bowel sounds. Musculoskeletal:  No clubbing, cyanosis or edema bilaterally. Full range of motion without deformity. Skin: Skin color, texture, turgor normal.  No rashes or lesions. Neurologic:  Neurovascularly intact without any focal sensory/motor deficits.  Cranial nerves: II-XII intact, grossly non-focal.  Psychiatric:  Alert and oriented, thought content appropriate, normal insight  Capillary Refill: Brisk,3 seconds, normal  Peripheral Pulses: +2 palpable, equal bilaterally       Labs:     Recent Labs     10/27/21  1549   WBC 5.2   HGB 11.8*   HCT 35.7*        Recent Labs     10/27/21  1548      K 3.9      CO2 26   BUN 9   CREATININE 0.6   CALCIUM 9.4     Recent Labs     10/27/21  1548   AST 23   ALT 38   BILITOT <0.2   ALKPHOS 71     No results for input(s): INR in the last 72 hours. Recent Labs     10/27/21  1548   TROPONINI <0.01       Urinalysis:      Lab Results   Component Value Date    NITRU Negative 10/07/2021    BLOODU Negative 10/07/2021    SPECGRAV <=1.005 10/07/2021    GLUCOSEU Negative 10/07/2021       Radiology:     CXR: I have reviewed the CXR with the following interpretation: N/A  EKG:  I have reviewed the EKG with the following interpretation: Sinus arrhythmia, VR = 65, normal intervals, no acute ST-T changes    CT CHEST PULMONARY EMBOLISM W CONTRAST   Final Result   1. No evidence of acute or chronic pulmonary embolus         2. Radiographic improvement with mild residual bilateral basilar groundglass airspace disease and significant resolution of the mid and upper lung field patchy regions of groundglass consolidation.          NM Cardiac Stress Test Nuclear Imaging    (Results Pending)       ASSESSMENT:    Active Hospital Problems    Diagnosis Date Noted    Chest pain [R07.9] 10/27/2021   Given comorbid conditions (hypertension, DM-not on medications at home, morbid obesity and recent Covid infection) will get further cardiac work-up  #Recent COVID-19 pneumonia  #Essential hypertension  #DM-2, with hyperglycemia not on any medications at home  #Morbid obesity with BMI of 47  #Possible undiagnosed MACI    PLAN:  -Pain relief as needed  -Trend cardiac enzymes  -N.p.o. from midnight  -Check lipid panel and A1c  -Nuclear medicine stress test in a.m.  -Continue on home antihypertensives  -Continue on current insulin regimen  -Recommended outpatient sleep studies and CPAP  -Supportive therapy      DVT Prophylaxis: Lovenox  Diet: Diet NPO  Code Status: Full Code    PT/OT Eval Status: As tolerated    Dispo -GMF with telemetry       Yoel Navarrete MD    Thank you MARK Palma CNP for the opportunity to be involved in this patient's care. If you have any questions or concerns please feel free to contact me at [phone].

## 2021-10-28 NOTE — PROGRESS NOTES
4 Eyes Admission Assessment     I agree as the admission nurse that 2 RN's have performed a thorough Head to Toe Skin Assessment on the patient. ALL assessment sites listed below have been assessed on admission. Areas assessed by both nurses:   [x]   Head, Face, and Ears   [x]   Shoulders, Back, and Chest  [x]   Arms, Elbows, and Hands   [x]   Coccyx, Sacrum, and Ischium  [x]   Legs, Feet, and Heels        Does the Patient have Skin Breakdown?   No         Harvey Prevention initiated:  No   Wound Care Orders initiated:  No      [facility] nurse consulted for Pressure Injury (Stage 3,4, Unstageable, DTI, NWPT, and Complex wounds) or Harvey score 18 or lower:  No      Nurse 1 eSignature: Electronically signed by Lynn Sow RN on 10/28/21 at 4:10 AM EDT    **SHARE this note so that the co-signing nurse is able to place an eSignature**    Nurse 2 eSignature: Electronically signed by Mary Uriostegui RN on 10/28/21 at 6:32 AM EDT

## 2021-10-29 VITALS
SYSTOLIC BLOOD PRESSURE: 158 MMHG | WEIGHT: 257.28 LBS | DIASTOLIC BLOOD PRESSURE: 75 MMHG | HEART RATE: 56 BPM | OXYGEN SATURATION: 97 % | HEIGHT: 63 IN | BODY MASS INDEX: 45.59 KG/M2 | RESPIRATION RATE: 17 BRPM | TEMPERATURE: 97.6 F

## 2021-10-29 LAB
GLUCOSE BLD-MCNC: 113 MG/DL (ref 70–99)
GLUCOSE BLD-MCNC: 124 MG/DL (ref 70–99)
GLUCOSE BLD-MCNC: 127 MG/DL (ref 70–99)
LEFT VENTRICULAR EJECTION FRACTION MODE: NORMAL
LV EF: 65 %
PERFORMED ON: ABNORMAL

## 2021-10-29 PROCEDURE — C1769 GUIDE WIRE: HCPCS

## 2021-10-29 PROCEDURE — 93458 L HRT ARTERY/VENTRICLE ANGIO: CPT

## 2021-10-29 PROCEDURE — 2580000003 HC RX 258: Performed by: INTERNAL MEDICINE

## 2021-10-29 PROCEDURE — 2709999900 HC NON-CHARGEABLE SUPPLY

## 2021-10-29 PROCEDURE — 2500000003 HC RX 250 WO HCPCS

## 2021-10-29 PROCEDURE — C1894 INTRO/SHEATH, NON-LASER: HCPCS

## 2021-10-29 PROCEDURE — 6370000000 HC RX 637 (ALT 250 FOR IP)

## 2021-10-29 PROCEDURE — 99153 MOD SED SAME PHYS/QHP EA: CPT

## 2021-10-29 PROCEDURE — B2151ZZ FLUOROSCOPY OF LEFT HEART USING LOW OSMOLAR CONTRAST: ICD-10-PCS | Performed by: INTERNAL MEDICINE

## 2021-10-29 PROCEDURE — 6360000002 HC RX W HCPCS

## 2021-10-29 PROCEDURE — 6370000000 HC RX 637 (ALT 250 FOR IP): Performed by: INTERNAL MEDICINE

## 2021-10-29 PROCEDURE — 6360000004 HC RX CONTRAST MEDICATION: Performed by: INTERNAL MEDICINE

## 2021-10-29 PROCEDURE — 2060000000 HC ICU INTERMEDIATE R&B

## 2021-10-29 PROCEDURE — 99152 MOD SED SAME PHYS/QHP 5/>YRS: CPT

## 2021-10-29 PROCEDURE — B2111ZZ FLUOROSCOPY OF MULTIPLE CORONARY ARTERIES USING LOW OSMOLAR CONTRAST: ICD-10-PCS | Performed by: INTERNAL MEDICINE

## 2021-10-29 PROCEDURE — 6360000002 HC RX W HCPCS: Performed by: INTERNAL MEDICINE

## 2021-10-29 PROCEDURE — 93458 L HRT ARTERY/VENTRICLE ANGIO: CPT | Performed by: INTERNAL MEDICINE

## 2021-10-29 PROCEDURE — 99255 IP/OBS CONSLTJ NEW/EST HI 80: CPT | Performed by: INTERNAL MEDICINE

## 2021-10-29 PROCEDURE — 4A023N7 MEASUREMENT OF CARDIAC SAMPLING AND PRESSURE, LEFT HEART, PERCUTANEOUS APPROACH: ICD-10-PCS | Performed by: INTERNAL MEDICINE

## 2021-10-29 RX ORDER — SODIUM CHLORIDE 0.9 % (FLUSH) 0.9 %
5-40 SYRINGE (ML) INJECTION EVERY 12 HOURS SCHEDULED
Status: DISCONTINUED | OUTPATIENT
Start: 2021-10-29 | End: 2021-10-29 | Stop reason: HOSPADM

## 2021-10-29 RX ORDER — ONDANSETRON 2 MG/ML
4 INJECTION INTRAMUSCULAR; INTRAVENOUS EVERY 6 HOURS PRN
Status: DISCONTINUED | OUTPATIENT
Start: 2021-10-29 | End: 2021-10-29 | Stop reason: HOSPADM

## 2021-10-29 RX ORDER — HYDROCHLOROTHIAZIDE 25 MG/1
25 TABLET ORAL EVERY MORNING
Qty: 30 TABLET | Refills: 1 | Status: SHIPPED | OUTPATIENT
Start: 2021-10-29

## 2021-10-29 RX ORDER — LISINOPRIL 20 MG/1
20 TABLET ORAL DAILY
Qty: 30 TABLET | Refills: 1 | Status: SHIPPED | OUTPATIENT
Start: 2021-10-29 | End: 2022-03-18 | Stop reason: SDUPTHER

## 2021-10-29 RX ORDER — SODIUM CHLORIDE 0.9 % (FLUSH) 0.9 %
5-40 SYRINGE (ML) INJECTION PRN
Status: DISCONTINUED | OUTPATIENT
Start: 2021-10-29 | End: 2021-10-29 | Stop reason: HOSPADM

## 2021-10-29 RX ORDER — ATROPINE SULFATE 0.4 MG/ML
0.5 AMPUL (ML) INJECTION
Status: DISCONTINUED | OUTPATIENT
Start: 2021-10-29 | End: 2021-10-29 | Stop reason: HOSPADM

## 2021-10-29 RX ORDER — ACETAMINOPHEN 325 MG/1
650 TABLET ORAL EVERY 4 HOURS PRN
Status: DISCONTINUED | OUTPATIENT
Start: 2021-10-29 | End: 2021-10-29 | Stop reason: HOSPADM

## 2021-10-29 RX ORDER — HYDRALAZINE HYDROCHLORIDE 20 MG/ML
10 INJECTION INTRAMUSCULAR; INTRAVENOUS ONCE
Status: COMPLETED | OUTPATIENT
Start: 2021-10-29 | End: 2021-10-29

## 2021-10-29 RX ORDER — METFORMIN HYDROCHLORIDE 500 MG/1
1000 TABLET, EXTENDED RELEASE ORAL
Qty: 60 TABLET | Refills: 0 | Status: SHIPPED | OUTPATIENT
Start: 2021-10-29 | End: 2021-12-15 | Stop reason: SDUPTHER

## 2021-10-29 RX ORDER — AMLODIPINE BESYLATE 5 MG/1
5 TABLET ORAL DAILY
Status: DISCONTINUED | OUTPATIENT
Start: 2021-10-29 | End: 2021-10-29 | Stop reason: HOSPADM

## 2021-10-29 RX ORDER — ATORVASTATIN CALCIUM 40 MG/1
40 TABLET, FILM COATED ORAL NIGHTLY
Qty: 30 TABLET | Refills: 1 | Status: SHIPPED | OUTPATIENT
Start: 2021-10-29 | End: 2022-03-18 | Stop reason: SDUPTHER

## 2021-10-29 RX ORDER — SODIUM CHLORIDE 9 MG/ML
25 INJECTION, SOLUTION INTRAVENOUS PRN
Status: DISCONTINUED | OUTPATIENT
Start: 2021-10-29 | End: 2021-10-29 | Stop reason: HOSPADM

## 2021-10-29 RX ORDER — SODIUM CHLORIDE 9 MG/ML
INJECTION, SOLUTION INTRAVENOUS CONTINUOUS
Status: DISCONTINUED | OUTPATIENT
Start: 2021-10-29 | End: 2021-10-29 | Stop reason: HOSPADM

## 2021-10-29 RX ORDER — IBUPROFEN 400 MG/1
400 TABLET ORAL
Qty: 21 TABLET | Refills: 0 | Status: SHIPPED | OUTPATIENT
Start: 2021-10-29 | End: 2021-11-05

## 2021-10-29 RX ADMIN — ASPIRIN 81 MG: 81 TABLET, CHEWABLE ORAL at 08:24

## 2021-10-29 RX ADMIN — LISINOPRIL AND HYDROCHLOROTHIAZIDE 1 TABLET: 12.5; 2 TABLET ORAL at 08:24

## 2021-10-29 RX ADMIN — AMLODIPINE BESYLATE 5 MG: 5 TABLET ORAL at 19:01

## 2021-10-29 RX ADMIN — HYDRALAZINE HYDROCHLORIDE 10 MG: 20 INJECTION, SOLUTION INTRAMUSCULAR; INTRAVENOUS at 18:28

## 2021-10-29 RX ADMIN — SODIUM CHLORIDE: 9 INJECTION, SOLUTION INTRAVENOUS at 14:35

## 2021-10-29 RX ADMIN — IBUPROFEN 400 MG: 200 TABLET, FILM COATED ORAL at 08:24

## 2021-10-29 RX ADMIN — IOHEXOL 100 ML: 350 INJECTION, SOLUTION INTRAVENOUS at 13:59

## 2021-10-29 RX ADMIN — SODIUM CHLORIDE, PRESERVATIVE FREE 10 ML: 5 INJECTION INTRAVENOUS at 08:25

## 2021-10-29 ASSESSMENT — PAIN SCALES - GENERAL
PAINLEVEL_OUTOF10: 2
PAINLEVEL_OUTOF10: 0
PAINLEVEL_OUTOF10: 0
PAINLEVEL_OUTOF10: 4

## 2021-10-29 ASSESSMENT — PAIN - FUNCTIONAL ASSESSMENT
PAIN_FUNCTIONAL_ASSESSMENT: ACTIVITIES ARE NOT PREVENTED
PAIN_FUNCTIONAL_ASSESSMENT: ACTIVITIES ARE NOT PREVENTED

## 2021-10-29 ASSESSMENT — PAIN DESCRIPTION - LOCATION
LOCATION: CHEST;SHOULDER
LOCATION: CHEST;SHOULDER

## 2021-10-29 ASSESSMENT — PAIN DESCRIPTION - PAIN TYPE
TYPE: ACUTE PAIN
TYPE: ACUTE PAIN

## 2021-10-29 ASSESSMENT — PAIN DESCRIPTION - ORIENTATION
ORIENTATION: LEFT
ORIENTATION: LEFT

## 2021-10-29 ASSESSMENT — PAIN DESCRIPTION - DESCRIPTORS
DESCRIPTORS: ACHING
DESCRIPTORS: ACHING

## 2021-10-29 ASSESSMENT — PAIN DESCRIPTION - ONSET
ONSET: ON-GOING
ONSET: ON-GOING

## 2021-10-29 ASSESSMENT — PAIN DESCRIPTION - FREQUENCY
FREQUENCY: CONTINUOUS
FREQUENCY: CONTINUOUS

## 2021-10-29 ASSESSMENT — PAIN DESCRIPTION - PROGRESSION
CLINICAL_PROGRESSION: NOT CHANGED
CLINICAL_PROGRESSION: NOT CHANGED

## 2021-10-29 NOTE — PROGRESS NOTES
acetaminophen **OR** acetaminophen, polyethylene glycol, nitroGLYCERIN      Intake/Output Summary (Last 24 hours) at 10/29/2021 1332  Last data filed at 10/29/2021 1304  Gross per 24 hour   Intake 180 ml   Output --   Net 180 ml       Physical Exam Performed:    BP (!) 150/85   Pulse 55   Temp 97.6 °F (36.4 °C) (Oral)   Resp 16   Ht 5' 3\" (1.6 m)   Wt 257 lb 4.4 oz (116.7 kg)   SpO2 95%   BMI 45.57 kg/m²     General appearance: No apparent distress, appears stated age and cooperative. HEENT: Pupils equal, round, and reactive to light. Conjunctivae/corneas clear. Neck: Supple, with full range of motion. No jugular venous distention. Trachea midline. Respiratory:  Normal respiratory effort. Clear to auscultation, bilaterally without Rales/Wheezes/Rhonchi. Cardiovascular: Regular rate and rhythm with normal S1/S2 without murmurs, rubs or gallops. Abdomen: Soft, non-tender, non-distended with normal bowel sounds. Musculoskeletal: No clubbing, cyanosis or edema bilaterally. Full range of motion without deformity. Skin: Skin color, texture, turgor normal.  No rashes or lesions. Neurologic:  Neurovascularly intact without any focal sensory/motor deficits. Cranial nerves: II-XII intact, grossly non-focal.  Psychiatric: Alert and oriented, thought content appropriate, normal insight  Capillary Refill: Brisk,< 3 seconds   Peripheral Pulses: +2 palpable, equal bilaterally       Labs:   Recent Labs     10/27/21  1549   WBC 5.2   HGB 11.8*   HCT 35.7*        Recent Labs     10/27/21  1548      K 3.9      CO2 26   BUN 9   CREATININE 0.6   CALCIUM 9.4     Recent Labs     10/27/21  1548   AST 23   ALT 38   BILITOT <0.2   ALKPHOS 71     No results for input(s): INR in the last 72 hours.   Recent Labs     10/27/21  1548 10/28/21  0031   TROPONINI <0.01 <0.01       Urinalysis:      Lab Results   Component Value Date    NITRU Negative 10/07/2021    BLOODU Negative 10/07/2021    SPECGRAV <=1.005 10/07/2021    GLUCOSEU Negative 10/07/2021       Radiology:  NM Cardiac Stress Test Nuclear Imaging   Final Result      CT CHEST PULMONARY EMBOLISM W CONTRAST   Final Result   1. No evidence of acute or chronic pulmonary embolus         2. Radiographic improvement with mild residual bilateral basilar groundglass airspace disease and significant resolution of the mid and upper lung field patchy regions of groundglass consolidation. Assessment/Plan:    Active Hospital Problems    Diagnosis Date Noted    Chest pain [R07.9] 10/27/2021         DVT Prophylaxis: lovenox  Diet: Diet NPO  Code Status: Full Code    PT/OT Eval Status: N/A    Dispo -inpatient. Cardiology consultation.     This chart was likely completed using voice recognition technology and may contain unintended grammatical , phraseology,and/or punctuation errors      Ronald Bowles MD

## 2021-10-29 NOTE — PLAN OF CARE
Problem: Pain:  Description: Pain management should include both nonpharmacologic and pharmacologic interventions. Goal: Pain level will decrease  Note: Patient pain has been controlled with scheduled Advil. Will continue to monitor. Problem: Safety:  Goal: Free from accidental physical injury  Note: Patient has remained free from falls or any injury during shift. Patient continues to be UAL and calls out appropriately.

## 2021-10-29 NOTE — PROCEDURES
[address]               [address]                            CARDIAC CATHETERIZATION    PATIENT NAME: Enrico Cardenas                       :        1970  MED REC NO:   [id number]                          ROOM:       5308  ACCOUNT NO:   [de-identified]                           ADMIT DATE: 10/27/2021  PROVIDER:     Claudell Patch. Rubye Locks, MD    DATE OF PROCEDURE:  10/29/2021    INDICATION FOR PROCEDURE:  The patient recently had COVID disease, but  is feeling good from that regard. She never lost her taste or smell. She is stable from a Pulmonary perspective. She presented with chest  pains that were somewhat atypical.  Troponins were negative, but a  nuclear cardiac imaging test showed anterior wall reversible ischemia. Because of her complaint of chest pain and the abnormal nuclear cardiac  stress test, she was brought for cardiac catheterization. DESCRIPTION OF PROCEDURE:  Prior to the procedure, I checked an Sushil's  test of the right wrist and it was within normal limits. She was  prepped and draped in a sterile manner. After informed consent was  obtained, the start time of the procedure was 1331 and the end time was  1400. She received 1 mg of Versed and 50 mcg of fentanyl by my order,  IV push and I monitored the cardiopulmonary status the entire procedure  along with the team.  The 5/6 Slender sheath was placed in a retrograde  manner using modified Seldinger technique in the right radial artery  using a micropuncture kit. The location was confirmed on fluoroscopy  and I used a micropuncture catheter first before I put the 5/6 Slender  sheath in position. Then, it was aspirated and flushed and we injected  the usual cocktail through the side port, which includes verapamil,  heparin, and nitroglycerin in standard dose.   That was flushed through  and then a JR4 catheter with a Wholey wire was advanced easily into the  ascending aorta. I was able to gain access into the left ventricle  easily. So, I placed a right coronary catheter in the right ventricle. The LVEDP was 4 mmHg. Hand injection in the SR projection shows LV  ejection fraction of 60% without wall motion abnormality. There was no  mitral regurgitation seen. There was no gradient upon pullback from the  left ventricle to the ascending aorta. Next, I engaged the right  coronary artery ostium. Injection of the right coronary showed a normal  right coronary artery, it was dominant. There was a high bifurcation. The right posterior descending is normal.  The right posterior lateral  branches are normal.  I removed this catheter over the 0.035 J-wire. While maintaining position, we placed the JL4 catheter into the  ascending aorta and engaged the left main coronary artery. The left  main coronary artery is short and normal.  It gives rise to a large left  circumflex coronary artery that is angiographically normal.  Obtuse  marginal branches 1, 2, and 3 are all normal.  KINA grade 3 flow is  present in the left main and the circumflex. The left anterior descending coronary artery comes off as expected off  this short left main coronary artery and it is angiographically normal.   There was KINA grade 3 flow in the left anterior descending coronary  artery. The diagonal branches are normal.  The septal   branches are normal.  The JL4 catheter was wired out of the body through  the wrist.  The wrist was aspirated and flushed. The patient tolerated  the procedure very well. CONCLUSIONS:  Normal epicardial coronary arteries. Right coronary  dominance. LV ejection fraction is 60% without wall motion abnormality. LVEDP 5 mmHg. No mitral regurgitation. No gradient across the aortic  valve. PLAN:  Hydration. Bedrest.  Treat hypertension. Risk factor  modification.   After two hours of bedrest and several hours of  hydration, the patient could be discharged home from the cardiac  perspective. Estimated blood loss was less than 15 mL. The patient  tolerated the procedure well with no evidence of complications at this  juncture.         Rodney Phipps MD    D: 10/29/2021 14:11:43       T: 10/29/2021 15:17:48     [email or web address]  Job#: [id number]     Doc#: [id number]    CC:  Dr. Jose Gaston

## 2021-10-29 NOTE — DISCHARGE SUMMARY
Hospital Medicine Discharge Summary    Patient ID: Yvonne Space      Patient's PCP: MARK Zavala CNP    Admit Date: 10/27/2021     Discharge Date:   10/29/2021    Admitting Physician: Jose D Smith MD     Discharge Physician: Jose D Smith MD     Discharge Diagnoses: Active Hospital Problems    Diagnosis Date Noted    Chest pain [R07.9] 10/27/2021       The patient was seen and examined on day of discharge and this discharge summary is in conjunction with any daily progress note from day of discharge. Hospital Course: This 58-year-old female with history of diabetes mellitus type 2, hypertension, low suspicion COVID-19 pneumonia came to ER with a complaint of chest pain pleuritic in nature. Stress test was obtained showed intermediate risk scan. Underwent left heart cath showed normal coronary arteries. Cleared by cardiology for discharge. Physical Exam Performed:     BP (!) 150/85   Pulse 55   Temp 97.6 °F (36.4 °C) (Oral)   Resp 16   Ht 5' 3\" (1.6 m)   Wt 257 lb 4.4 oz (116.7 kg)   SpO2 95%   BMI 45.57 kg/m²       General appearance:  No apparent distress, appears stated age and cooperative. HEENT:  Normal cephalic, atraumatic without obvious deformity. Pupils equal, round, and reactive to light. Extra ocular muscles intact. Conjunctivae/corneas clear. Neck: Supple, with full range of motion. No jugular venous distention. Trachea midline. Respiratory:  Normal respiratory effort. Clear to auscultation, bilaterally without Rales/Wheezes/Rhonchi. Cardiovascular:  Regular rate and rhythm with normal S1/S2 without murmurs, rubs or gallops. Abdomen: Soft, non-tender, non-distended with normal bowel sounds. Musculoskeletal:  No clubbing, cyanosis or edema bilaterally. Full range of motion without deformity. Skin: Skin color, texture, turgor normal.  No rashes or lesions. Neurologic:  Neurovascularly intact without any focal sensory/motor deficits.  Cranial nerves: II-XII intact, grossly non-focal.  Psychiatric:  Alert and oriented, thought content appropriate, normal insight  Capillary Refill: Brisk,< 3 seconds   Peripheral Pulses: +2 palpable, equal bilaterally       Labs: For convenience and continuity at follow-up the following most recent labs are provided:      CBC:    Lab Results   Component Value Date    WBC 5.2 10/27/2021    HGB 11.8 10/27/2021    HCT 35.7 10/27/2021     10/27/2021       Renal:    Lab Results   Component Value Date     10/27/2021    K 3.9 10/27/2021     10/27/2021    CO2 26 10/27/2021    BUN 9 10/27/2021    CREATININE 0.6 10/27/2021    CALCIUM 9.4 10/27/2021         Significant Diagnostic Studies    Radiology:   NM Cardiac Stress Test Nuclear Imaging   Final Result      CT CHEST PULMONARY EMBOLISM W CONTRAST   Final Result   1. No evidence of acute or chronic pulmonary embolus         2. Radiographic improvement with mild residual bilateral basilar groundglass airspace disease and significant resolution of the mid and upper lung field patchy regions of groundglass consolidation.                 Consults:     IP CONSULT TO HOSPITALIST  IP CONSULT TO CARDIOLOGY    Disposition: Home    Condition at Discharge: Stable    Discharge Instructions/Follow-up: PCP    Code Status:  Full Code     Activity: activity as tolerated    Diet: diabetic diet      Discharge Medications:     Current Discharge Medication List           Details   ibuprofen (ADVIL;MOTRIN) 400 MG tablet Take 1 tablet by mouth 3 times daily (with meals) for 7 days  Qty: 21 tablet, Refills: 0      lisinopril (PRINIVIL;ZESTRIL) 20 MG tablet Take 1 tablet by mouth daily  Qty: 30 tablet, Refills: 1      hydroCHLOROthiazide (HYDRODIURIL) 25 MG tablet Take 1 tablet by mouth every morning  Qty: 30 tablet, Refills: 1      atorvastatin (LIPITOR) 40 MG tablet Take 1 tablet by mouth nightly  Qty: 30 tablet, Refills: 1              Details   metFORMIN (GLUCOPHAGE-XR) 500 MG extended release tablet Take 2 tablets by mouth daily (with breakfast)  Qty: 60 tablet, Refills: 0              Details   aspirin 81 MG chewable tablet Take 81 mg by mouth daily             Time Spent on discharge is more than 30 minutes in the examination, evaluation, counseling and review of medications and discharge plan. This chart was likely completed using voice recognition technology and may contain unintended grammatical , phraseology,and/or punctuation errors    Signed:    Gloria Joseph MD   10/29/2021      Thank you MARK Kong CNP for the opportunity to be involved in this patient's care. If you have any questions or concerns please feel free to contact me at [phone].

## 2021-10-29 NOTE — PROGRESS NOTES
Patient is A&OX4. VSS. Patient continues to have upper chest/shoulder paint today, scheduled Advil is given with benefit. Patient is resting comfortably in chair with no needs at this time. Will continue to monitor.      Electronically signed by Andrew Zayas RN on 10/29/2021 at 11:58 AM

## 2021-10-29 NOTE — CONSULTS
[address]         Reason for Consultation/Chief Complaint: \"I had chest pain. \"       History of Present Illness:  Jacobo Zimmerman is a 46 y.o. patient who presented to the hospital with complaints of sharp and then dull left chest pain into left shoulder. CT PA negative for ischemia. Trop negative. MPI 2 day showed anterior reversible ischemia. RBA cath and PCI explained and pt wishes to proceed. R allens test normal     Past Medical History:   has a past medical history of Hypertension. Surgical History:   has a past surgical history that includes partial hysterectomy (cervix not removed) (12/2014). Social History:   reports that she has never smoked. She has never used smokeless tobacco. She reports that she does not drink alcohol and does not use drugs. Family History:  No evidence for sudden cardiac death or premature CAD    Home Medications:  Were reviewed and are listed in nursing record. and/or listed below  Prior to Admission medications    Medication Sig Start Date End Date Taking?  Authorizing Provider   lisinopril-hydroCHLOROthiazide (PRINZIDE;ZESTORETIC) 20-12.5 MG per tablet Take 1 tablet by mouth daily 10/13/21  Yes Iza Talley MD   metFORMIN (GLUCOPHAGE-XR) 500 MG extended release tablet Take 1 tablet by mouth daily (with breakfast) 10/13/21  Yes Iza Talley MD   aspirin 81 MG chewable tablet Take 81 mg by mouth daily   Yes Historical Provider, MD        Hospital Medications:  Asha Bloom by provider] ibuprofen  400 mg Oral TID WC    insulin glargine  5 Units SubCUTAneous Nightly    aspirin  81 mg Oral Daily    lisinopril-hydroCHLOROthiazide  1 tablet Oral Daily    sodium chloride flush  5-40 mL IntraVENous 2 times per day    atorvastatin  40 mg Oral Nightly    insulin lispro  0-12 Units SubCUTAneous TID     insulin lispro  0-6 Units SubCUTAneous Nightly    enoxaparin  40 mg SubCUTAneous Daily     glucose, dextrose, glucagon (rDNA), dextrose, sodium chloride flush, sodium chloride, ondansetron **OR** ondansetron, acetaminophen **OR** acetaminophen, polyethylene glycol, nitroGLYCERIN   dextrose      sodium chloride         Allergies:  Patient has no known allergies. Review of Systems:     A 14 ROS obtained and negative except as mentioned in HPI. · Constitutional: there has been no unanticipated weight loss. · Eyes: No visual changes or diplopia. No scleral icterus. · ENT: No Headaches, hearing loss or vertigo. No mouth sores or sore throat. · Cardiovascular: No loss of consciousness. No hemoptysis, pleuritic pain, or phlebitis. · Respiratory: No cough or wheezing, no sputum production. No hematemesis. · Gastrointestinal: No abdominal pain, appetite loss, blood in stools. No change in bowel or bladder habits. · Genitourinary: No dysuria, or hematuria. · Musculoskeletal:  No gait disturbance, weakness or joint complaints. · Integumentary: No rash or pruritis. · Neurological: No headache, diplopia,numbness or tingling. No change in gait, balance, coordination, mood, affect, memory, mentation, behavior. · Psychiatric: No anxiety,  · Endocrine: No malaise,  · Hematologic/Lymphatic: No abnormal bruising  · Allergic/Immunologic: No nasal congestion      Physical Examination:    Vitals:    10/29/21 1038   BP: (!) 150/85   Pulse: 55   Resp: 16   Temp: 97.6 °F (36.4 °C)   SpO2: 95%    Weight: 257 lb 4.4 oz (116.7 kg)         General Appearance:  Alert, cooperative, no distress, appears stated age   Head:  Normocephalic, without obvious abnormality, atraumatic   Eyes:  PERRL   Nose: Nares normal,   Neck: Supple, JVP normal   Lungs:   Clear to auscultation bilaterally, respirations unlabored   Chest Wall:  No tenderness or deformity   Heart:  Regular rate and rhythm, normal S1, S2 normal, no murmur,  No rub.  No S3 / S4 gallop   Abdomen:   Soft, non-tender, +bowel sounds   Extremities: no cyanosis, no clubbing , No  edema   Pulses: Symmetric  extremities   Skin: no gross lesions or rashes   Pysch: Normal mood and affect   Neurologic: No gross deficits. CN II - XII grossly intact        Labs  CBC:   Lab Results   Component Value Date    WBC 5.2 10/27/2021    RBC 4.57 10/27/2021    HGB 11.8 10/27/2021    HCT 35.7 10/27/2021    MCV 78.0 10/27/2021    RDW 16.4 10/27/2021     10/27/2021     CMP:    Lab Results   Component Value Date     10/27/2021    K 3.9 10/27/2021     10/27/2021    CO2 26 10/27/2021    BUN 9 10/27/2021    CREATININE 0.6 10/27/2021    GFRAA >60 10/27/2021    AGRATIO 1.2 10/27/2021    LABGLOM >60 10/27/2021    GLUCOSE 193 10/27/2021    PROT 6.9 10/27/2021    CALCIUM 9.4 10/27/2021    BILITOT <0.2 10/27/2021    ALKPHOS 71 10/27/2021    AST 23 10/27/2021    ALT 38 10/27/2021     PT/INR:  No results found for: PTINR  Recent Labs     10/27/21  1548 10/28/21  0031   TROPONINI <0.01 <0.01       EKG:  SR  With NSST/T wave changes    Assessment  Patient Active Problem List   Diagnosis    Pneumonia due to COVID-19 virus    Hypoxia    Essential hypertension    Diabetes mellitus (HCC)    Class 3 severe obesity with body mass index (BMI) of 45.0 to 49.9 in adult [facility])    Chest pain        Impression:  Atypical chest pain. Abnormal nuclear cardiac scan. Recommendations:    I had the opportunity to review the clinical symptoms and presentation of Kenya Capellan. I recommend that the patient undergo further cardiac evaluation with ardiac catheterization with possible coronary intervention. Thank you for allowing to us to participate in the care or Kenya Capellan. All questions and concerns were addressed to the patient/family. Alternatives to my treatment were discussed. The note was completed using EMR. Every effort was made to ensure accuracy; however, inadvertent computerized transcription errors may be present.        Juan Antonio Witt MD [facility]

## 2021-10-29 NOTE — PLAN OF CARE
Brief Pre-Op Note/Sedation Assessment      Joana Carbajal  1970  [id number]  1:28 PM    Planned Procedure: Cardiac Catheterization Procedure  Post Procedure Plan: Return to same level of care  Consent: I have discussed with the patient and/or the patient representative the indication, alternatives, and the possible risks and/or complications of the planned procedure and the anesthesia methods. The patient and/or patient representative appear to understand and agree to proceed. Chief Complaint:   Chest Pain/Pressure      Indications for Cath Procedure:  1. Presentation:  Suspected CAD  2. Anginal Classification within 2 weeks:  CCS II - Slight limitation, with angina only during vigorous physical activity  3. Angina Symptoms Assessment:  Atypical Chest Pain  4. Heart Failure Class within last 2 weeks:  No symptoms  5. Cardiovascular Instability:  No    Prior Ischemic Workup/Eval:  1. Pre-Procedural Medications: Yes: Aspirin  2. Stress Test Completed? Yes:  Stress or Imaging Studies Performed (within ANY time period):   Type:  Stress Nuclear  Results:  Positive:  Myocardial Perfusion Defects (Nuclear) Extent of Ischemia:  Intermediate    Does Patient need surgery?   Cath Valve Surgery:  No    Pre-Procedure Medical History:  Vital Signs:  BP (!) 150/85   Pulse 55   Temp 97.6 °F (36.4 °C) (Oral)   Resp 16   Ht 5' 3\" (1.6 m)   Wt 257 lb 4.4 oz (116.7 kg)   SpO2 95%   BMI 45.57 kg/m²     Allergies:  No Known Allergies  Medications:    Current Facility-Administered Medications   Medication Dose Route Frequency Provider Last Rate Last Admin    [Held by provider] ibuprofen (ADVIL;MOTRIN) tablet 400 mg  400 mg Oral TID WC Kuldip Staples MD   400 mg at 10/29/21 0824    insulin glargine (LANTUS;BASAGLAR) injection pen 5 Units  5 Units SubCUTAneous Nightly Kuldip Staples MD   5 Units at 10/28/21 2051    aspirin chewable tablet 81 mg  81 mg Oral Daily Eric Vanegas MD   81 mg at 10/29/21 8766  lisinopril-hydroCHLOROthiazide (PRINZIDE;ZESTORETIC) 20-12.5 MG per tablet 1 tablet  1 tablet Oral Daily Delaney Larios MD   1 tablet at 10/29/21 0824    glucose (GLUTOSE) 40 % oral gel 15 g  15 g Oral PRN Delaney Larios MD        dextrose 50 % IV solution  12.5 g IntraVENous PRN Delaney Larios MD        glucagon (rDNA) injection 1 mg  1 mg IntraMUSCular PRN Delaney Larios MD        dextrose 5 % solution  100 mL/hr IntraVENous PRN Delaney Larios MD        sodium chloride flush 0.9 % injection 5-40 mL  5-40 mL IntraVENous 2 times per day Delaney Larios MD   10 mL at 10/29/21 0825    sodium chloride flush 0.9 % injection 5-40 mL  5-40 mL IntraVENous PRN Stas Méndez MD        0.9 % sodium chloride infusion  25 mL IntraVENous PRN Delaney Larios MD        ondansetron (ZOFRAN-ODT) disintegrating tablet 4 mg  4 mg Oral Q8H PRN Delaney Larios MD        Or    ondansetron (ZOFRAN) injection 4 mg  4 mg IntraVENous Q6H PRN Delaney Larios MD        acetaminophen (TYLENOL) tablet 650 mg  650 mg Oral Q6H PRN Delaney Larios MD   650 mg at 10/28/21 6319    Or    acetaminophen (TYLENOL) suppository 650 mg  650 mg Rectal Q6H PRN Delaney Larios MD        polyethylene glycol (GLYCOLAX) packet 17 g  17 g Oral Daily PRN Delaney Larios MD        atorvastatin (LIPITOR) tablet 40 mg  40 mg Oral Nightly Delaney Larios MD   40 mg at 10/28/21 2051    insulin lispro (1 Unit Dial) 0-12 Units  0-12 Units SubCUTAneous TID MICHEAL Larios MD   2 Units at 10/28/21 1250    insulin lispro (1 Unit Dial) 0-6 Units  0-6 Units SubCUTAneous Nightly Stas Méndez MD        enoxaparin (LOVENOX) injection 40 mg  40 mg SubCUTAneous Daily Stas Marcus MD   40 mg at 10/28/21 0826    nitroGLYCERIN (NITROSTAT) SL tablet 0.4 mg  0.4 mg SubLINGual Q5 Min PRN Delaney Larios MD           Past Medical History:    Past Medical History:   Diagnosis Date    Hypertension        Surgical History:    Past Surgical History:   Procedure Laterality Date    PARTIAL HYSTERECTOMY  12/2014    partial             Pre-Sedation:  Pre-Sedation Documentation and Exam:  I have personally completed a history, physical exam & review of systems for this patient (see notes). Prior History of Anesthesia Complications:   none    Modified Mallampati:  II (soft palate, uvula, fauces visible)    ASA Classification:  Class 2 - A normal healthy patient with mild systemic disease    Estevan Scale: Activity:  2 - Able to move 4 extremities voluntarily on command  Respiration:  2 - Able to breathe deeply and cough freely  Circulation:  1 - BP+/- 20-50mmHg of normal  Consciousness:  2 - Fully awake  Oxygen Saturation (color):  2 - Able to maintain oxygen saturation >92% on room air    Sedation/Anesthesia Plan:  Guard the patient's safety and welfare. Minimize physical discomfort and pain. Minimize negative psychological responses to treatment by providing sedation and analgesia and maximize the potential amnesia. Patient to meet pre-procedure discharge plan.     Medication Planned:  midazolam intravenously and fentanyl intravenously    Patient is an appropriate candidate for plan of sedation:   yes      Electronically signed by Juan Antonio Witt MD on 10/29/2021 at 1:28 PM normal behavior/normal affect

## 2021-10-30 NOTE — PROGRESS NOTES
Pt was discharged at 2000 by wheelchair down to the lobby where her ride picked her up. IV was taken out and tele was removed. Pt was given discharge instructions and verbalized understanding. A copy was placed in chart. This RN took her down to the lobby where her ride picked her up.

## 2021-11-05 ENCOUNTER — CARE COORDINATION (OUTPATIENT)
Dept: CASE MANAGEMENT | Age: 51
End: 2021-11-05

## 2021-11-09 NOTE — PROGRESS NOTES
Chief Complaint   Patient presents with   Thais Barakat Establish Care         ASSESSMENT/PLAN:  1. Physical exam  VS reviewed     BMI reviewed   All questions answered. F/u discussed. Appropriate healthy lifestyle modifications discussed. 2. Colon cancer screening  Ordered, follow-up results  - Cologuard; Future    3. Type 2 diabetes mellitus with hyperglycemia, without long-term current use of insulin (HCC)  We will try to work her way up to 2000 mg of Metformin daily. Described that this is the therapeutic dose to help decrease A1c which is above goal at 7.4%  Follow-up in 3 months  Other labs up-to-date  If A1c is still elevated above goal, will consider adding Trulicity. 4. Essential hypertension  Controlled in triage  Continue regimen of lisinopril and hydrochlorothiazide  Follow-up in 3 months to continue to monitor  Labs up-to-date    5. PND (post-nasal drip)  We will start Zyrtec to decrease mucus production to help with postnasal drip, therefore mild cough and throat clearing  Follow-up in 4 to 6 weeks if needed to gauge improvement  - cetirizine (ZYRTEC) 10 MG tablet; Take 1 tablet by mouth daily  Dispense: 90 tablet; Refill: 1    6. Hirsutism  We will start spironolactone and follow-up in 3 months  If no decrease in hair on chin, will increase to 100 mg daily  - spironolactone (ALDACTONE) 50 MG tablet; Take 1 tablet by mouth daily  Dispense: 30 tablet; Refill: 5    I have spent 30 minutes of face to face time with the patient with more than 50%  spent counseling , educating and coordinating care for hirsutism, BP, DM and PND. HPI:  Jacqueline Luz is a 46 y.o. (: 1970) here today for physical exam and would like to discuss chronic condition mgmt. HTN  Rx: lisinopril 20 mg daily and HCTZ 25 mg daily  Compliant: yes  Does check BP at home.    Brought logs: 140s/90  Lab Results   Component Value Date    CREATININE 0.6 10/27/2021     BP Readings from Last 3 Encounters:   11/10/21 117/71 10/29/21 (!) 158/75   10/13/21 138/65       DMT2  Rx: Metformin XR 1000 mg daily  Pt denied any hypoglycemia episodes nor symptoms suggestive of hypoglycemia which include profuse sweating, anxiousness, light headedness. Check BG at home:   Pt is on an ACEI/ARB, ASA, Statin. Vaccinations: due for flu, boostrix and shingles. Lab Results   Component Value Date    LABA1C 7.4 10/28/2021     Lab Results   Component Value Date    CREATININE 0.6 10/27/2021     Lab Results   Component Value Date    LABMICR Not Indicated 10/07/2021       HLD  Rx: lipitor 40 mg daily and ASA daily  The 10-year ASCVD risk score (Samantha Joseph, et al., 2013) is: 10.5%    Values used to calculate the score:      Age: 46 years      Sex: Female      Is Non- : Yes      Diabetic: Yes      Tobacco smoker: No      Systolic Blood Pressure: 020 mmHg      Is BP treated: Yes      HDL Cholesterol: 37 mg/dL      Total Cholesterol: 187 mg/dL      PND   Clearing throat and coughing often. Has seasonal allergies  No treatment. Facial hair that she is having to shave and causing her to not feel feminine. Is wondering if there is something that can be done. Already on metformin and working on dietary changes to lose weight. Review of Systems   Constitutional: Negative for activity change, appetite change, chills, fatigue, fever and unexpected weight change. HENT: Negative for congestion, postnasal drip, rhinorrhea, sinus pressure, sinus pain, sneezing and sore throat. Eyes: Negative for visual disturbance. Respiratory: Negative for cough, chest tightness, shortness of breath and wheezing. Cardiovascular: Negative for chest pain and palpitations. Gastrointestinal: Negative for abdominal pain, blood in stool, constipation, diarrhea, nausea and vomiting. Endocrine: Negative for cold intolerance, heat intolerance, polydipsia and polyuria.    Genitourinary: Negative for dysuria, frequency, vaginal bleeding and vaginal discharge. Musculoskeletal: Negative for arthralgias, back pain, joint swelling, myalgias and neck pain. Skin: Negative for rash and wound. Allergic/Immunologic: Negative for environmental allergies. Neurological: Negative for dizziness, tremors, syncope, weakness, light-headedness, numbness and headaches. Hematological: Negative for adenopathy. Psychiatric/Behavioral: Negative for behavioral problems, decreased concentration, sleep disturbance and suicidal ideas. The patient is not nervous/anxious. Past Medical History:   Diagnosis Date    Hypertension        Family History   Problem Relation Age of Onset    Breast Cancer Mother     Diabetes Father     Diabetes Paternal Grandmother        Social History     Tobacco Use    Smoking status: Never Smoker    Smokeless tobacco: Never Used   Vaping Use    Vaping Use: Unknown   Substance Use Topics    Alcohol use: Never    Drug use: Never       New Prescriptions    CETIRIZINE (ZYRTEC) 10 MG TABLET    Take 1 tablet by mouth daily    SPIRONOLACTONE (ALDACTONE) 50 MG TABLET    Take 1 tablet by mouth daily       Meds Prior to visit:  Current Outpatient Medications on File Prior to Visit   Medication Sig Dispense Refill    metFORMIN (GLUCOPHAGE-XR) 500 MG extended release tablet Take 2 tablets by mouth daily (with breakfast) 60 tablet 0    lisinopril (PRINIVIL;ZESTRIL) 20 MG tablet Take 1 tablet by mouth daily 30 tablet 1    hydroCHLOROthiazide (HYDRODIURIL) 25 MG tablet Take 1 tablet by mouth every morning 30 tablet 1    atorvastatin (LIPITOR) 40 MG tablet Take 1 tablet by mouth nightly 30 tablet 1    aspirin 81 MG chewable tablet Take 81 mg by mouth daily      ibuprofen (ADVIL;MOTRIN) 400 MG tablet Take 1 tablet by mouth 3 times daily (with meals) for 7 days 21 tablet 0     No current facility-administered medications on file prior to visit.      No Known Allergies    OBJECTIVE:  /71   Pulse 65   Temp 97.3 °F (36.3 °C) (Temporal)   Resp 16   Ht 5' 3\" (1.6 m)   Wt 261 lb (118.4 kg)   Breastfeeding No   BMI 46.23 kg/m²   BP Readings from Last 2 Encounters:   11/10/21 117/71   10/29/21 (!) 158/75     Wt Readings from Last 3 Encounters:   11/10/21 261 lb (118.4 kg)   10/29/21 257 lb 4.4 oz (116.7 kg)   10/13/21 257 lb 8 oz (116.8 kg)       Physical Exam  Vitals reviewed. Constitutional:       General: She is not in acute distress. Appearance: Normal appearance. She is well-developed. She is obese. She is not ill-appearing. HENT:      Head: Normocephalic and atraumatic. Right Ear: Tympanic membrane, ear canal and external ear normal. There is no impacted cerumen. Left Ear: Tympanic membrane, ear canal and external ear normal. There is no impacted cerumen. Nose: Nose normal.      Mouth/Throat:      Mouth: Mucous membranes are moist.      Pharynx: Oropharynx is clear. No oropharyngeal exudate or posterior oropharyngeal erythema. Eyes:      General: No scleral icterus. Right eye: No discharge. Left eye: No discharge. Conjunctiva/sclera: Conjunctivae normal.      Pupils: Pupils are equal, round, and reactive to light. Cardiovascular:      Rate and Rhythm: Normal rate and regular rhythm. Pulses: Normal pulses. Heart sounds: Normal heart sounds. No murmur heard. Comments: Radial and pedal pulses intact  Pulmonary:      Effort: Pulmonary effort is normal. No respiratory distress. Breath sounds: Normal breath sounds. No wheezing or rales. Chest:      Chest wall: No tenderness. Abdominal:      General: Bowel sounds are normal.      Palpations: Abdomen is soft. Tenderness: There is no abdominal tenderness. There is no guarding. Hernia: No hernia is present. Comments: Normal liver and spleen. No organomegaly   Musculoskeletal:         General: No tenderness. Normal range of motion. Cervical back: Normal range of motion and neck supple.       Comments: Intact in all extremities   Lymphadenopathy:      Cervical: No cervical adenopathy. Skin:     General: Skin is warm. Findings: No erythema or rash. Neurological:      General: No focal deficit present. Mental Status: She is alert and oriented to person, place, and time. Mental status is at baseline. Sensory: No sensory deficit. Motor: No weakness or abnormal muscle tone. Coordination: Coordination normal.      Gait: Gait normal.      Deep Tendon Reflexes: Reflexes normal.   Psychiatric:         Mood and Affect: Mood normal.         Behavior: Behavior normal.         Thought Content: Thought content normal.         Judgment: Judgment normal.         Lab Results   Component Value Date    WBC 5.2 10/27/2021    HGB 11.8 (L) 10/27/2021    HCT 35.7 (L) 10/27/2021    MCV 78.0 (L) 10/27/2021     10/27/2021     Lab Results   Component Value Date     10/27/2021    K 3.9 10/27/2021     10/27/2021    CO2 26 10/27/2021    BUN 9 10/27/2021    CREATININE 0.6 10/27/2021    GLUCOSE 193 (H) 10/27/2021    CALCIUM 9.4 10/27/2021    PROT 6.9 10/27/2021    LABALBU 3.8 10/27/2021    BILITOT <0.2 10/27/2021    ALKPHOS 71 10/27/2021    AST 23 10/27/2021    ALT 38 10/27/2021    LABGLOM >60 10/27/2021    GFRAA >60 10/27/2021    AGRATIO 1.2 10/27/2021    GLOB 3.1 10/27/2021     Lab Results   Component Value Date    CHOL 187 10/28/2021     Lab Results   Component Value Date    TRIG 103 10/28/2021     Lab Results   Component Value Date    HDL 37 (L) 10/28/2021     Lab Results   Component Value Date    LDLCALC 129 (H) 10/28/2021     Lab Results   Component Value Date    LABVLDL 21 10/28/2021     Lab Results   Component Value Date    LABA1C 7.4 10/28/2021         Discussed use, benefit, and side effects of prescribed medications. Barriers to medication compliance addressed. All patient questions answered. Pt voiced understanding. RTC Return in about 3 months (around 2/10/2022).     Future Appointments   Date Time Provider Nikolai Dozier   2/10/2022  9:30 AM DO SANTOSH Walker - VERENICE Rendon MD  11/10/2021  11:12 AM

## 2021-11-10 ENCOUNTER — OFFICE VISIT (OUTPATIENT)
Dept: PRIMARY CARE CLINIC | Age: 51
End: 2021-11-10

## 2021-11-10 VITALS
HEIGHT: 63 IN | HEART RATE: 65 BPM | WEIGHT: 261 LBS | RESPIRATION RATE: 16 BRPM | TEMPERATURE: 97.3 F | DIASTOLIC BLOOD PRESSURE: 71 MMHG | BODY MASS INDEX: 46.25 KG/M2 | SYSTOLIC BLOOD PRESSURE: 117 MMHG

## 2021-11-10 DIAGNOSIS — R09.82 PND (POST-NASAL DRIP): ICD-10-CM

## 2021-11-10 DIAGNOSIS — E11.65 TYPE 2 DIABETES MELLITUS WITH HYPERGLYCEMIA, WITHOUT LONG-TERM CURRENT USE OF INSULIN (HCC): ICD-10-CM

## 2021-11-10 DIAGNOSIS — Z12.11 COLON CANCER SCREENING: ICD-10-CM

## 2021-11-10 DIAGNOSIS — I10 ESSENTIAL HYPERTENSION: ICD-10-CM

## 2021-11-10 DIAGNOSIS — L68.0 HIRSUTISM: ICD-10-CM

## 2021-11-10 DIAGNOSIS — Z00.00 PHYSICAL EXAM: Primary | ICD-10-CM

## 2021-11-10 PROCEDURE — 3051F HG A1C>EQUAL 7.0%<8.0%: CPT | Performed by: FAMILY MEDICINE

## 2021-11-10 PROCEDURE — 99396 PREV VISIT EST AGE 40-64: CPT | Performed by: FAMILY MEDICINE

## 2021-11-10 PROCEDURE — 99214 OFFICE O/P EST MOD 30 MIN: CPT | Performed by: FAMILY MEDICINE

## 2021-11-10 RX ORDER — CETIRIZINE HYDROCHLORIDE 10 MG/1
10 TABLET ORAL DAILY
Qty: 90 TABLET | Refills: 1 | Status: SHIPPED | OUTPATIENT
Start: 2021-11-10

## 2021-11-10 RX ORDER — SPIRONOLACTONE 50 MG/1
50 TABLET, FILM COATED ORAL DAILY
Qty: 30 TABLET | Refills: 5 | Status: SHIPPED | OUTPATIENT
Start: 2021-11-10

## 2021-11-10 ASSESSMENT — ENCOUNTER SYMPTOMS
BLOOD IN STOOL: 0
DIARRHEA: 0
CHEST TIGHTNESS: 0
CONSTIPATION: 0
SINUS PRESSURE: 0
RHINORRHEA: 0
SINUS PAIN: 0
WHEEZING: 0
COUGH: 0
SHORTNESS OF BREATH: 0
SORE THROAT: 0
BACK PAIN: 0
VOMITING: 0
ABDOMINAL PAIN: 0
NAUSEA: 0

## 2021-11-11 ENCOUNTER — CARE COORDINATION (OUTPATIENT)
Dept: CASE MANAGEMENT | Age: 51
End: 2021-11-11

## 2021-11-11 NOTE — CARE COORDINATION
Date/Time:  11/11/2021 11:47 AM  Attempted to reach patient by telephone. Call within 2 business days of discharge: Yes, Left HIPPA compliant message requesting a return call. CTN will close out COVID -19 Monitoring episode at this time, as today was scheduled for final follow up COVID -19 call.     Thank Vida Tellez RN  Care Transition Coordinator  Contact [facility]:[phone]

## 2021-12-15 RX ORDER — METFORMIN HYDROCHLORIDE 500 MG/1
1000 TABLET, EXTENDED RELEASE ORAL
Qty: 60 TABLET | Refills: 0 | Status: SHIPPED | OUTPATIENT
Start: 2021-12-15 | End: 2022-01-16

## 2021-12-15 NOTE — TELEPHONE ENCOUNTER
Medication:   Requested Prescriptions     Pending Prescriptions Disp Refills    metFORMIN (GLUCOPHAGE-XR) 500 MG extended release tablet 60 tablet 0     Sig: Take 2 tablets by mouth daily (with breakfast)         Last Filled:  10/29/21    Patient Phone Number: [phone] (home) [phone] (work)    Last appt: 11/10/2021   Next appt: 2/10/2022    Last OARRS: No flowsheet data found.

## 2021-12-15 NOTE — TELEPHONE ENCOUNTER
Med refill    metFORMIN (GLUCOPHAGE-XR) 500 MG extended release tablet    [id number] - [facility] [address], [address] [place] [address] - F [phone]

## 2022-01-16 RX ORDER — METFORMIN HYDROCHLORIDE 500 MG/1
TABLET, EXTENDED RELEASE ORAL
Qty: 180 TABLET | Refills: 1 | Status: SHIPPED | OUTPATIENT
Start: 2022-01-16

## 2022-03-18 RX ORDER — ATORVASTATIN CALCIUM 40 MG/1
40 TABLET, FILM COATED ORAL NIGHTLY
Qty: 30 TABLET | Refills: 1 | Status: SHIPPED | OUTPATIENT
Start: 2022-03-18 | End: 2022-06-07

## 2022-03-18 RX ORDER — LISINOPRIL 20 MG/1
20 TABLET ORAL DAILY
Qty: 30 TABLET | Refills: 1 | Status: SHIPPED | OUTPATIENT
Start: 2022-03-18 | End: 2022-06-07

## 2022-03-18 NOTE — TELEPHONE ENCOUNTER
Medication:   Requested Prescriptions     Pending Prescriptions Disp Refills    lisinopril (PRINIVIL;ZESTRIL) 20 MG tablet 30 tablet 1     Sig: Take 1 tablet by mouth daily    atorvastatin (LIPITOR) 40 MG tablet 30 tablet 1     Sig: Take 1 tablet by mouth nightly        Last Filled:      Patient Phone Number: [phone] (home) [phone] (work)    Last appt: 11/10/2021   Next appt: Visit date not found    Last OARRS: No flowsheet data found.

## 2022-06-07 RX ORDER — LISINOPRIL 20 MG/1
20 TABLET ORAL DAILY
Qty: 30 TABLET | Refills: 1 | Status: SHIPPED | OUTPATIENT
Start: 2022-06-07

## 2022-06-07 RX ORDER — ATORVASTATIN CALCIUM 40 MG/1
TABLET, FILM COATED ORAL
Qty: 30 TABLET | Refills: 1 | Status: SHIPPED | OUTPATIENT
Start: 2022-06-07

## 2022-06-07 NOTE — TELEPHONE ENCOUNTER
LOV 11/10/21 NOV-Non scheduled        Ref Range & Units 10/28/21 0556   Cholesterol, Total 0 - 199 mg/dL 187    Triglycerides 0 - 150 mg/dL 103    HDL 40 - 60 mg/dL 37 Low     LDL Calculated <100 mg/dL 129 High     VLDL Cholesterol Calculated Not Established mg/dL 21    Resulting Agency  [facility]